# Patient Record
Sex: MALE | URBAN - METROPOLITAN AREA
[De-identification: names, ages, dates, MRNs, and addresses within clinical notes are randomized per-mention and may not be internally consistent; named-entity substitution may affect disease eponyms.]

---

## 2022-08-25 ENCOUNTER — TELEPHONE (OUTPATIENT)
Dept: GASTROENTEROLOGY | Facility: CLINIC | Age: 72
End: 2022-08-25

## 2022-08-25 NOTE — TELEPHONE ENCOUNTER
08/25/22  Screened by: Arianne Pereyra    Referring Provider     Pre- Screening: There is no height or weight on file to calculate BMI  Has patient been referred for a routine screening Colonoscopy? yes  Is the patient between 39-70 years old? yes      Previous Colonoscopy yes   If yes:    Date: 6-10 yrs ago w/ Dr Neymar Mesa:     Reason:       SCHEDULING STAFF: If the patient is between 39yrs-47yrs, please advise patient to confirm benefits/coverage with their insurance company for a routine screening colonoscopy, some insurance carriers will only cover at [facility] or [facility]  If the patient is over 66years old, please schedule an office visit  Does the patient want to see a Gastroenterologist prior to their procedure OR are they having any GI symptoms? no    Has the patient been hospitalized or had abdominal surgery in the past 6 months? no    Does the patient use supplemental oxygen? no    Does the patient take Coumadin, Lovenox, Plavix, Elliquis, Xarelto, or other blood thinning medication? no    Has the patient had a stroke, cardiac event, or stent placed in the past year? no     PT PASSED OA AND CAN BE REACHED [phone]  Previous pt of Dr Marilyn Mcmillan  SCHEDULING STAFF: If patient answers NO to above questions, then schedule procedure  If patient answers YES to above questions, then schedule office appointment  If patient is between 45yrs - 49yrs, please advise patient that we will have to confirm benefits & coverage with their insurance company for a routine screening colonoscopy

## 2022-10-07 ENCOUNTER — TELEPHONE (OUTPATIENT)
Dept: GASTROENTEROLOGY | Facility: CLINIC | Age: 72
End: 2022-10-07

## 2022-10-07 DIAGNOSIS — Z12.11 ENCOUNTER FOR SCREENING COLONOSCOPY: Primary | ICD-10-CM

## 2022-10-07 NOTE — TELEPHONE ENCOUNTER
Scheduled date of colonoscopy (as of today):10/17/22  Physician performing colonoscopy:Dr Albert Bernard  Location of colonoscopy:Endo  Bowel prep reviewed with patient:Vilma  Instructions reviewed with patient by: Buzz Yung  Clearances: No

## 2022-10-17 ENCOUNTER — ANESTHESIA EVENT (OUTPATIENT)
Dept: GASTROENTEROLOGY | Facility: AMBULATORY SURGERY CENTER | Age: 72
End: 2022-10-17

## 2022-10-17 ENCOUNTER — ANESTHESIA (OUTPATIENT)
Dept: GASTROENTEROLOGY | Facility: AMBULATORY SURGERY CENTER | Age: 72
End: 2022-10-17

## 2022-10-17 ENCOUNTER — HOSPITAL ENCOUNTER (OUTPATIENT)
Dept: GASTROENTEROLOGY | Facility: AMBULATORY SURGERY CENTER | Age: 72
Discharge: HOME | End: 2022-10-17
Payer: COMMERCIAL

## 2022-10-17 VITALS
OXYGEN SATURATION: 95 % | DIASTOLIC BLOOD PRESSURE: 66 MMHG | SYSTOLIC BLOOD PRESSURE: 116 MMHG | RESPIRATION RATE: 19 BRPM | BODY MASS INDEX: 32.15 KG/M2 | HEART RATE: 60 BPM | HEIGHT: 65 IN | WEIGHT: 193 LBS | TEMPERATURE: 97.5 F

## 2022-10-17 DIAGNOSIS — Z86.010 HISTORY OF COLON POLYPS: ICD-10-CM

## 2022-10-17 PROCEDURE — 45380 COLONOSCOPY AND BIOPSY: CPT | Performed by: INTERNAL MEDICINE

## 2022-10-17 PROCEDURE — 88305 TISSUE EXAM BY PATHOLOGIST: CPT | Performed by: PATHOLOGY

## 2022-10-17 RX ORDER — BUPROPION HYDROCHLORIDE 200 MG/1
200 TABLET, EXTENDED RELEASE ORAL 2 TIMES DAILY
COMMUNITY

## 2022-10-17 RX ORDER — PROPOFOL 10 MG/ML
INJECTION, EMULSION INTRAVENOUS AS NEEDED
Status: DISCONTINUED | OUTPATIENT
Start: 2022-10-17 | End: 2022-10-17

## 2022-10-17 RX ORDER — LIDOCAINE HYDROCHLORIDE 10 MG/ML
INJECTION, SOLUTION EPIDURAL; INFILTRATION; INTRACAUDAL; PERINEURAL AS NEEDED
Status: DISCONTINUED | OUTPATIENT
Start: 2022-10-17 | End: 2022-10-17

## 2022-10-17 RX ORDER — SODIUM CHLORIDE, SODIUM LACTATE, POTASSIUM CHLORIDE, CALCIUM CHLORIDE 600; 310; 30; 20 MG/100ML; MG/100ML; MG/100ML; MG/100ML
50 INJECTION, SOLUTION INTRAVENOUS CONTINUOUS
Status: DISCONTINUED | OUTPATIENT
Start: 2022-10-17 | End: 2022-10-21 | Stop reason: HOSPADM

## 2022-10-17 RX ADMIN — PROPOFOL 100 MG: 10 INJECTION, EMULSION INTRAVENOUS at 09:15

## 2022-10-17 RX ADMIN — PROPOFOL 20 MG: 10 INJECTION, EMULSION INTRAVENOUS at 09:32

## 2022-10-17 RX ADMIN — PROPOFOL 30 MG: 10 INJECTION, EMULSION INTRAVENOUS at 09:21

## 2022-10-17 RX ADMIN — PROPOFOL 20 MG: 10 INJECTION, EMULSION INTRAVENOUS at 09:29

## 2022-10-17 RX ADMIN — PROPOFOL 20 MG: 10 INJECTION, EMULSION INTRAVENOUS at 09:23

## 2022-10-17 RX ADMIN — PROPOFOL 50 MG: 10 INJECTION, EMULSION INTRAVENOUS at 09:16

## 2022-10-17 RX ADMIN — LIDOCAINE HYDROCHLORIDE 50 MG: 10 INJECTION, SOLUTION EPIDURAL; INFILTRATION; INTRACAUDAL; PERINEURAL at 09:15

## 2022-10-17 RX ADMIN — SODIUM CHLORIDE, SODIUM LACTATE, POTASSIUM CHLORIDE, CALCIUM CHLORIDE 50 ML/HR: 600; 310; 30; 20 INJECTION, SOLUTION INTRAVENOUS at 09:04

## 2022-10-17 RX ADMIN — PROPOFOL 20 MG: 10 INJECTION, EMULSION INTRAVENOUS at 09:26

## 2022-10-17 NOTE — H&P
History and Physical - [facility]  Shila Barboza 81 y o  male MRN: [id number]    HPI: Shila Barboza is a 81y o  year old male who presents for average risk screening colonoscopy    REVIEW OF SYSTEMS: Per the HPI, and otherwise unremarkable  Historical Information   No past medical history on file  No past surgical history on file  Social History   Social History     Substance and Sexual Activity   Alcohol Use Not on file     Social History     Substance and Sexual Activity   Drug Use Not on file     Social History     Tobacco Use   Smoking Status Not on file   Smokeless Tobacco Not on file     No family history on file  Meds/Allergies       Current Outpatient Medications:   •  polyethylene glycol (GOLYTELY) 4000 mL solution    Current Facility-Administered Medications:   •  lactated ringers infusion, 50 mL/hr, Intravenous, Continuous    Not on File    Objective     There were no vitals taken for this visit  PHYSICAL EXAM    Gen: NAD AAOx3  Head: Normocephalic, Atraumatic  CV: S1S2 RRR no m/r/g  CHEST: Clear b/l no c/r/w  ABD: soft, +BS NT/ND  EXT: no edema    ASSESSMENT/PLAN:  This is a 81y o  year old male here for average risk screening colonoscopy, and he is stable and optimized for his procedure

## 2022-10-17 NOTE — ANESTHESIA PREPROCEDURE EVALUATION
Procedure:  COLONOSCOPY    Relevant Problems   ANESTHESIA (within normal limits)      CARDIO   (+) Hyperlipidemia      GI/HEPATIC   (+) GERD (gastroesophageal reflux disease)      NEURO/PSYCH   (+) Anxiety   (+) Depression      PULMONARY  no known MARCOS, pt reports he suspects it   (-) Smoking (remote former heavy smoker)   (-) URI (upper respiratory infection)    BMI 34    Physical Exam    Airway    Mallampati score: I  TM Distance: >3 FB       Dental   Comment: Denies loose but some broken,     Cardiovascular      Pulmonary      Other Findings         Anesthesia Plan  ASA Score- 2     Anesthesia Type- IV sedation with anesthesia with ASA Monitors  Additional Monitors:   Airway Plan:           Plan Factors-Exercise tolerance (METS): >4 METS  Chart reviewed  Existing labs reviewed  Patient summary reviewed  Patient is a current smoker (MJ)  Induction- intravenous  Postoperative Plan-     Informed Consent- Anesthetic plan and risks discussed with patient  I personally reviewed this patient with the CRNA  Discussed and agreed on the Anesthesia Plan with the CRNA  Dara Soulier

## 2022-10-17 NOTE — ANESTHESIA POSTPROCEDURE EVALUATION
Post-Op Assessment Note    CV Status:  Stable  Pain Score: 0    Pain management: adequate     Mental Status:  Alert and awake   Hydration Status:  Euvolemic   PONV Controlled:  Controlled   Airway Patency:  Patent      Post Op Vitals Reviewed: Yes      Staff: Anesthesiologist, CRNA         No complications documented      BP   101/64   Temp   na   Pulse  60   Resp  14   SpO2   96

## 2022-11-28 NOTE — PSYCH
[facility]/Hospital: [facility]   [address] [facility]    Psychiatric Progress Note  MRN#: [id number]  Louise Guerrier 81 y o  male    This note was not shared with the patient due to reasonable likelihood of causing patient harm   This Patient was seen in the office today at [place] [address] location  This is a transfer patient of LILIANA Wiseman    Subjective:  Erlin Felix has history of depression, today reported not feeling depressed , more anxiety  With  Intellectualization defense and humor  Later reported that the anxiety is due to normal things  Mentioned about fellow that live at Erlin Felix address -rooming house  Erlin Felix  does favors for him and he does for Erlin Felix  Don't think he's a bad chelsie, although Erlin Felix wants to leave - complaints of noise leave - and chelsie slamming doors  Erlin Felix anxiety  Is linked to generalized worries , butterflys     On Wellbutrin 200mg BID- Notice difference with staying off task  Medication side effects - change how is mouth feels inside     Erlin Felix was  AAOx 2 ( person, time, slight confusion regarding location   Reported that as [employer], then [employer]   Also stated [employer] linked to [address]        ROS:  Sleep - stable   Denies SI, HI  Without  H/o SA    Medical ROS:  Sprained his leg        Allergies   Allergen Reactions   • Prednisone Other (See Comments)     Pt states " I felt like the veins in my neck were over pressurized"       Medical Hx:   HLD  H/o BPV ( while Erlin Felix was in HS)  Tinnitus - while laying down spinaling  Ashley Chamberlain reported that as tolerable     Social Hx  Children ; none  Marital Status : single  Living Situation- DORINA Scherer in rooming house  Occupational : was a kinza mittalist; on welfare  Functional Supports: Erlin Felix circles of friends   ADL/iADL- independent , drives      MMSE:   Also reported location as [facility]     Mental Status Evaluation:  General Appearance:  Azeb Vitale is a 81 y o   male casually dressed, adequate hygiene and grooming, looks stated age  Wearing glasses    Behavior:  pleasant, cooperative, adequate eye contact   Speech:  normal for rate, rhythm, volume, latency, amount   Mood:  anxious   Affect:  normal   Thought Process:  circumstantial and tangential   Thought Content:  no overt delusions, normal   Perceptual Disturbances: Does not appear preoccupied or responding   Delusions  No   Risk Potential: Suicidal Ideations No  Homicidal Ideations No  Potential for Aggression No     Sensorium:  Oriented to person, place, time/date and situation   Memory:  recent and remote memory grossly intact   Consciousness:  alert and awake   Attention: attention span and concentration are age appropriate   Insight:  fair   Judgment: fair   Gait/Station: abnormal gait  slightly wide going from sitting to standing with backwards bend   Motor Activity: no abnormal movements     There were no vitals filed for this visit  Medications:   Current Outpatient Medications on File Prior to Visit   Medication Sig Dispense Refill   • buPROPion (WELLBUTRIN SR) 200 MG 12 hr tablet Take 200 mg by mouth 2 (two) times a day     • VITAMIN D PO Take 1 tablet by mouth 2 (two) times a day     • [DISCONTINUED] polyethylene glycol (GOLYTELY) 4000 mL solution Take 4,000 mL by mouth once for 1 dose 4000 mL 0     No current facility-administered medications on file prior to visit  Labs: I have personally reviewed all pertinent laboratory/tests results   Most Recent Labs: No results found for: WBC, RBC, HGB, HCT, PLT, RDW, NEUTROABS, SODIUM, K, CL, CO2, BUN, CREATININE, GLUC, GLUF, CALCIUM, AST, ALT, ALKPHOS, TP, ALB, TBILI, CHOLESTEROL, HDL, TRIG, LDLCALC, NONHDLC, VALPROICTOT, CARBAMAZEPIN, LITHIUM, AMMONIA, [id number], FREET4, T3FREE, PREGSERUM, HCG, HCGQUANT, RPR, HGBA1C, EAG    ________________________________________________________________________    A/P  Fausto Masters is a 81 yr old  male , h/o depression and anxiety  Presented with some generalized anxiety like symptoms, unsure duration  On Bupropion 200mg BID x 15 yrs  Case complicated by s/e tinnitus  H/o of BPV, likely also the cause    DSM5  Unspecified Anxiety ;Rule out JÚNIOR   History of depression      PLAN:   Discussed diagnotic impression   Today Did not change Venkatesh's medication  Although Discussed options of antianxiety drug  Current Outpatient Medications:   •  buPROPion (WELLBUTRIN SR) 200 MG 12 hr tablet, Take 200 mg by mouth 2 (two) times a day, Disp: , Rfl:   •  VITAMIN D PO, Take 1 tablet by mouth 2 (two) times a day, Disp: , Rfl:      No orders of the defined types were placed in this encounter  Risks, benefits, and possible side effects of medications explained to patient and patient verbalizes understanding  Next Appointment:  3 month       Today's Appointment@ [facility]  Face To Face:  8:34 AM -9:10AM;Documentation Time:  9:15 AM- 9:33 AM    Encounter Duration: Time Spent 36 minutes with Patient  Greater than 50% of total time was spent with the patient

## 2022-11-28 NOTE — PATIENT INSTRUCTIONS
Norma Dye [id number]   Thanks for presenting to today's Appointment at [facility]   Your medications were not changed

## 2022-11-29 ENCOUNTER — OFFICE VISIT (OUTPATIENT)
Dept: PSYCHIATRY | Facility: CLINIC | Age: 72
End: 2022-11-29

## 2022-11-29 DIAGNOSIS — Z86.59 HISTORY OF DEPRESSION: Primary | ICD-10-CM

## 2022-11-29 RX ORDER — BUPROPION HYDROCHLORIDE 200 MG/1
TABLET, EXTENDED RELEASE ORAL
Qty: 60 TABLET | Refills: 2 | Status: SHIPPED | OUTPATIENT
Start: 2022-11-29

## 2023-04-28 DIAGNOSIS — Z86.59 HISTORY OF DEPRESSION: ICD-10-CM

## 2023-05-01 RX ORDER — BUPROPION HYDROCHLORIDE 200 MG/1
TABLET, EXTENDED RELEASE ORAL
Qty: 180 TABLET | Refills: 1 | Status: SHIPPED | OUTPATIENT
Start: 2023-05-01

## 2023-07-18 NOTE — PSYCH
[facility]/Hospital: [address] [facility], [address]    Psychiatric Progress Note  MRN#: [id number]  Leslie Lopez 72 y.o. male    This note was not shared with the patient due to reasonable likelihood of causing patient harm   _________________________________________________________________________________________________________________________________  OFFICE APPOINTMENT   Patient was seen today at [address] [facility] location                                                Patient Leslie Lopez ,1950   Prescriber/Physician: Soern Medellin DO Physician Location:   [address] [facility] [address]     • This service was provided in the office. Patient is currently located in the [place], where I am  licensed. • Patient gave consent to proceed with encounter; acknowledge understanding of security and privacy of encounter   • Patient verbalized understanding evaluation only involves Psychiatric diagnosing, prescribing, result monitoring   • Patient was informed this is a billable service  ___________________________________________________________________________________________________________________________________       Subjective:  Pepe Smith is upbeat, Denies Si/hi, although hypothetical thoughts. He's compliant to Bupropion , stated that's working - more organized , more effective. Denies recent falls . Pepe Smith spoke about prior events of not getting drafted. Denied trauma. He enjoys hobbies of wood work. ROS:  Sleep - stable , wake up intermittently  to use the bathroom. Depression- denies  Anxiety- mild       Medical ROS:  Sweating   Knee pain - get shots   No recent falls. Social Hx  [place] - lives with two roommates . No abuse    HMental Status Evaluation:  General Appearance:  Leslie Lopez is a 72 y.o.   male casually dressed, adequate hygiene and grooming, looks stated age. Wearing glasses    Behavior:  pleasant, cooperative, adequate eye contact   Speech:  normal for rate, rhythm, volume, latency, amount   Mood:  normal   Affect:  normal   Thought Process:  circumstantial and tangential   Thought Content:  no overt delusions, normal   Perceptual Disturbances: Does not appear preoccupied or responding   Delusions  No   Risk Potential: Suicidal Ideations No  Homicidal Ideations No  Potential for Aggression No     Sensorium:  Oriented to person, place, time/date and situation   Memory:  recent and remote memory grossly intact   Consciousness:  alert and awake   Attention: attention span and concentration are age appropriate   Insight:  fair   Judgment: fair   Gait/Station: normal   Motor Activity: no abnormal movements     There were no vitals filed for this visit. Medications:   Current Outpatient Medications on File Prior to Visit   Medication Sig Dispense Refill   • buPROPion (WELLBUTRIN SR) 200 MG 12 hr tablet TAKE ONE TABLET BY MOUTH TWICE A  tablet 1   • VITAMIN D PO Take 1 tablet by mouth 2 (two) times a day     • [DISCONTINUED] polyethylene glycol (GOLYTELY) 4000 mL solution Take 4,000 mL by mouth once for 1 dose 4000 mL 0     No current facility-administered medications on file prior to visit. Labs: I have personally reviewed all pertinent laboratory/tests results. Most Recent Labs: No results found for: "WBC", "RBC", "HGB", "HCT", "PLT", "RDW", "NEUTROABS", "SODIUM", "K", "CL", "CO2", "BUN", "CREATININE", "GLUC", "GLUF", "CALCIUM", "AST", "ALT", "ALKPHOS", "TP", "ALB", "TBILI", "CHOLESTEROL", "HDL", "TRIG", "LDLCALC", "[facility]", "VALPROICTOT", "CARBAMAZEPIN", "LITHIUM", "AMMONIA", "[id number]", "[place]", "Amparo Pian", "PREGSERUM", "HCG", "HCGQUANT", "RPR", "HGBA1C", "EAG"    ________________________________________________________________________    A/KADEEM Artis is a 72 y. o.old  male , h/o depression and anxiety. Presented with anixety. Today , Kitty Lazcano reported stable mood  In the setting of compliance to Bupropion. DSM5  Unspecified Anxiety  History of depression      PLAN:   Discussed diagnotic impression . External recorded reviewed . Did not change patients medications    Medications Prescribed during this Encounter at [facility]:    •  buPROPion (WELLBUTRIN SR) 200 MG 12 hr tablet, Bupropion HCL SR  200m tablet in the morning ( 7-8am) , 1 tablet in the afternoon ( 1pm), Disp: 180 tablet, Rfl: 0      No orders of the defined types were placed in this encounter. Risks, benefits, and possible side effects of medications explained to patient and patient verbalizes understanding. Next Appointment:  3 month       Today's Appointment@ [facility]  Face To Face:  8:32AM- 9:02AM ;Documentation Time:  9:15 AM- 9:33 AM    Encounter Duration: Time Spent 30 minutes with Patient. Greater than 50% of total time was spent with the patient       MDM  Number of Diagnoses or Management Options  Anxiety: established, improving  History of depression: established, improving     Amount and/or Complexity of Data Reviewed  Review and summarize past medical records: yes    Risk of Complications, Morbidity, and/or Mortality  Presenting problems: low  Diagnostic procedures: low  Management options: low

## 2023-07-18 NOTE — PATIENT INSTRUCTIONS
Reva Sibley [id number]   Thanks for presenting to today's Appointment at [facility]   Your medications were not changed

## 2023-07-21 ENCOUNTER — OFFICE VISIT (OUTPATIENT)
Dept: PSYCHIATRY | Facility: CLINIC | Age: 73
End: 2023-07-21
Payer: COMMERCIAL

## 2023-07-21 DIAGNOSIS — F41.9 ANXIETY: Primary | ICD-10-CM

## 2023-07-21 DIAGNOSIS — Z86.59 HISTORY OF DEPRESSION: ICD-10-CM

## 2023-07-21 PROCEDURE — 99212 OFFICE O/P EST SF 10 MIN: CPT | Performed by: PSYCHIATRY & NEUROLOGY

## 2023-07-21 RX ORDER — BUPROPION HYDROCHLORIDE 200 MG/1
TABLET, EXTENDED RELEASE ORAL
Qty: 180 TABLET | Refills: 0 | Status: SHIPPED | OUTPATIENT
Start: 2023-07-21

## 2023-10-11 NOTE — PSYCH
[facility]/Hospital: [address] [place] [address], [address]    Psychiatric Progress Note  MRN#: [id number]  Veronika Cespedes 72 y.o. male    This note was not shared with the patient due to reasonable likelihood of causing patient harm   _________________________________________________________________________________________________________________________________  OFFICE APPOINTMENT   Seen today at [address] [facility] location                                                Patient Veronika Cespedes ,1950   Prescriber/Physician: Cayla Hui DO Physician Location:   [address] [address]     This service was provided in the office. Patient is currently located in the [place], where I am  licensed. Patient gave consent to proceed with encounter; acknowledge understanding of security and privacy of encounter   [facility]  Patient verbalized understanding evaluation only involves Psychiatric diagnosing, prescribing, result monitoring   Patient was informed this is a billable service and legal  ___________________________________________________________________________________________________________________________________     Encounter: 10/13/23     Chief Complaint   Patient presents with   • Anxiety          Subjective: There's compliance to Bupropion and he thinks it working since adjustment to when he takes Bupropion ( 8am and 12pm). Otherwise anxiety> depression- about world situation and he's on welfare -and he wants to work again- although arthrisits get in [place] [address] [address].    Claudette Alpers stated worthlessness comments, otherwise  there's anxiety about "thinking  there's no way out, worries about his social security ."       ROS:  SI/HI  : denies  Depression: defer to above  Anxiety: defer to above Irritability: " always here"- frustrated easy, also impatience; if he noticed it, then he trys to reign himself in. Sleep: no problems  Energy: normal  Concentration- stable- he's able to stay on task while on Bupropoin        Medication: Juan Paris is  compliant Bupropion SR 100mg x 2  Medication s/e: S/e of taste in Venkatesh mouth- described as strange sensation - mild intensity and tolerable. Tobacco Use: Medium Risk (10/17/2022)    Patient History    • Smoking Tobacco Use: Former    • Smokeless Tobacco Use: Never    • Passive Exposure: Not on file        Former   Smokeless Tobacco: Never used smokeless tobacco.   Tobacco Cessation: Counseling given: Not Answered   Comments: Sergio Medellin last smoked 6-8 yrs ago     Vaping Use    Never used     Alcohol Use    Yes; 2.0 standard drinks of alcohol per week; 2 Standard drinks or equivalent. Drug Use    Yes; Marijuana. Comments: he smoke half times per wk about 1/3 joint         Medical ROS:   Constitutional: negative for chills and fevers  Respiratory: negative for wheezing and SOB  Cardiovascular: negative for chest pain and palpitations  Gastrointestinal: negative for nausea, vomiting, and indigestion  Neurological: positive for dizziness due to beign vertigo, negative for headaches   Pertinent items are noted in HPI, all other symptoms are negative           Mental Status Evaluation:  General Appearance:  Juan Paris is a 72 y.o.  male casually dressed, looks stated age.  Wearing glasses    Behavior:  Slight anxious , adequate eye contact   Speech:  normal for rate, rhythm, volume, latency, amount   Mood:  Mild anxiety   Affect:  mood-congruent   Thought Process:  circumstantial and tangential   Thought Content:  no overt delusions, normal   Perceptual Disturbances: Does not appear preoccupied or responding   Delusions  w/o   Risk Potential: Suicidal Ideations w/o  Homicidal Ideations w/o  Potential for Aggression No     Sensorium:  Oriented to person, place [facility]), time/date ( 2023) and situation   Memory:  recent and remote memory grossly intact   Consciousness:  alert and awake   Attention: attention span and concentration are age appropriate   Insight:  Appropriate    Judgment: Appropriate    Gait/Station: normal   Motor Activity: no abnormal movements     There were no vitals filed for this visit. Medications:   Current Outpatient Medications on File Prior to Visit   Medication Sig Dispense Refill   • buPROPion (WELLBUTRIN SR) 200 MG 12 hr tablet Bupropion HCL SR  200m tablet in the morning ( 7-8am) , 1 tablet in the afternoon ( 1pm) 180 tablet 0   • VITAMIN D PO Take 1 tablet by mouth 2 (two) times a day     • [DISCONTINUED] polyethylene glycol (GOLYTELY) 4000 mL solution Take 4,000 mL by mouth once for 1 dose 4000 mL 0     No current facility-administered medications on file prior to visit. Labs: I have personally reviewed all pertinent laboratory/tests results. Most Recent Labs: No results found for: "WBC", "RBC", "HGB", "HCT", "PLT", "RDW", "NEUTROABS", "SODIUM", "K", "CL", "CO2", "BUN", "CREATININE", "GLUC", "GLUF", "CALCIUM", "AST", "ALT", "ALKPHOS", "TP", "ALB", "TBILI", "CHOLESTEROL", "HDL", "TRIG", "LDLCALC", "[facility]", "VALPROICTOT", "CARBAMAZEPIN", "LITHIUM", "AMMONIA", "[id number]", "[employer]", "[place]", "PREGSERUM", "HCG", "HCGQUANT", "RPR", "HGBA1C", "EAG"    ________________________________________________________________________    A/P  Chance Hernandez is a 72 y. o.old  male , h/o depression and anxiety. Presented with anxiety, currently mild intensity. Currently with mild depressive finding , although devoid of SI since he compliant to Bupropion. DSM5  Unspecified Depression  Unspecified Anxiety        PLAN:   Discussed diagnotic impression . External recorded reviewed .    Did not change patients medications    Medications Prescribed during this Encounter at [facility]:    -     buPROPion (WELLBUTRIN SR) 200 MG 12 hr tablet; Bupropion HCL SR  200m tablet in the morning ( 7-8am) , 1 tablet in the afternoon ( 12pm)       No orders of the defined types were placed in this encounter. Risks, benefits, and possible side effects of medications explained to patient and patient verbalizes understanding. Next Appointment:  3 month       [facility]  Face To Face:  8:40AM-  9:12AM      Encounter Duration: Time Spent  32 minutes with Patient. Greater than 50% of total time was spent with the patient       MDM  Number of Diagnoses or Management Options  Diagnosis management comments: 2       Amount and/or Complexity of Data Reviewed  Review and summarize past medical records: yes    Risk of Complications, Morbidity, and/or Mortality  Presenting problems: low  Diagnostic procedures: low  Management options: low           This note may have been written with the assistance of dictation software.  Please excuse any grammatical  errors, misspellings,  and abnormal spacing of letters , sentences or paragraphs

## 2023-10-11 NOTE — PATIENT INSTRUCTIONS
Avtar Johnston [id number]   Thanks for presenting to today's Appointment at [facility]  Your medications were not changed

## 2023-10-13 ENCOUNTER — OFFICE VISIT (OUTPATIENT)
Dept: PSYCHIATRY | Facility: CLINIC | Age: 73
End: 2023-10-13
Payer: COMMERCIAL

## 2023-10-13 DIAGNOSIS — F41.9 ANXIETY: Primary | ICD-10-CM

## 2023-10-13 DIAGNOSIS — F32.A DEPRESSION, UNSPECIFIED DEPRESSION TYPE: ICD-10-CM

## 2023-10-13 PROBLEM — Z86.59 HISTORY OF DEPRESSION: Status: ACTIVE | Noted: 2023-10-13

## 2023-10-13 PROCEDURE — 99213 OFFICE O/P EST LOW 20 MIN: CPT | Performed by: PSYCHIATRY & NEUROLOGY

## 2023-10-13 RX ORDER — BUPROPION HYDROCHLORIDE 200 MG/1
TABLET, EXTENDED RELEASE ORAL
Qty: 180 TABLET | Refills: 0 | Status: SHIPPED | OUTPATIENT
Start: 2023-10-13

## 2023-10-25 DIAGNOSIS — F32.A DEPRESSION, UNSPECIFIED DEPRESSION TYPE: ICD-10-CM

## 2023-10-25 RX ORDER — BUPROPION HYDROCHLORIDE 200 MG/1
TABLET, EXTENDED RELEASE ORAL
Qty: 180 TABLET | Refills: 0 | Status: SHIPPED | OUTPATIENT
Start: 2023-10-25

## 2023-10-25 NOTE — TELEPHONE ENCOUNTER
Pt Emily Montgomery, 1950, [facility] chart reviewed, Bupropion 200mg was sent to [address] [place] [address],[address]     This note was not shared with the patient due to reasonable likelihood of causing patient harm
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
## 2024-01-30 NOTE — PATIENT INSTRUCTIONS
Venkatesh Pringle [id number]   Thanks for presenting to today's Appointment at [facility]  Your medications were not changed

## 2024-02-02 ENCOUNTER — OFFICE VISIT (OUTPATIENT)
Dept: PSYCHIATRY | Facility: CLINIC | Age: 74
End: 2024-02-02
Payer: COMMERCIAL

## 2024-02-02 DIAGNOSIS — F32.A DEPRESSION, UNSPECIFIED DEPRESSION TYPE: Primary | ICD-10-CM

## 2024-02-02 DIAGNOSIS — F41.9 ANXIETY: ICD-10-CM

## 2024-02-02 PROCEDURE — 99214 OFFICE O/P EST MOD 30 MIN: CPT | Performed by: PSYCHIATRY & NEUROLOGY

## 2024-02-02 RX ORDER — BUPROPION HYDROCHLORIDE 200 MG/1
TABLET, EXTENDED RELEASE ORAL
Qty: 180 TABLET | Refills: 0 | Status: SHIPPED | OUTPATIENT
Start: 2024-02-02

## 2024-02-02 NOTE — BH TREATMENT PLAN
TREATMENT PLAN                                                                [facility]     Name and Date of Birth:  Venkatesh Pringle 73 y.o. 1950  Date of Treatment Plan: February 2, 2024  Diagnosis/Diagnoses:    1. Depression, unspecified depression type    2. Anxiety      Strengths/Personal Resources for Self-Care:Venkatesh Pringle  supportive family, supportive friends, ability to communicate well, curiosity , has hobbies   Area/Areas of need (in own words): anxiety symptoms, depressive symptoms  1)  Long Term Goal:          maintain improvement in anxiety and depression  Reach Goal Date: 1 yr-2 yrs  Person/Persons responsible for completion of goal: Venkatesh Pringle    2. Short Term Objective (s) - How to reach this goal?:           Recommended treatment : Adherence to antidepressants due to diagnoses. Medication List : Bupropion          [facility] for symptoms improving           Routine monitoring of labs if necessary           CRISIS intervention/Acute Hospitalization if necessary   Reach Goal Date: 6 months- 1yr  Person/Persons Responsible for Completion of Goal: Venkatesh Pringle    Progress Towards Goals: stable  Treatment Modality: [facility]  Review due 180 days from date of this plan: 6 months - 8/2/2024  Expected length of service: 1-3yrs unless revised    This treatment plan was formulated via my Physician/ Psychiatrist and I . I, Venkatesh Pringle, patient , understand the goals and objectives listed . I agree to work on goals developed for my treatment.

## 2024-02-02 NOTE — BH CRISIS PLAN
"[facility] Mental Health Outpatient    Client Name: Venkatesh Pringle       Client YOB: 1950     CRISIS PLAN Creation Date: 02/02/24 and CRISIS PLAN Review Date : 1 yr -2/2/2025  ____________________________________________________________________________________________________________  [facility] SAFETY PLAN      Step 1: Warning Signs:   Venkatesh Pringle you stated:  Anger (Short Fuse)         Step 2: Internal Coping Strategies:   Venkatesh Pringle you stated:  Breathing techniques          Step 3: People and social settings that provide distraction                    Names                                                                    Contacts     Delmer Bradley    (Venkatesh friend)                     [phone]  Ernie Pringle ( Venkatesh Brother)                            Has number in his phone                                                                                                                                                        Places[place]                                                                                          Step 4: People whom I can ask for help during a crisis:                             Name                                                                       Contact  Khoa Huerta    ( Venkatesh neighbor)                        Has number in his phone  Fermin Champagne           \"             \"                                       \"           \"       \"    \"     \"         Step 5: Professionals or agencies I can contact during a crisis:Ask: “What are the names of health care professionals, agencies, hospitals or other organizations that you can contact during a crisis and how will you contact them (include phone number or other way to contact them)?”    Clinician/Agency Name:                                         Phone                                               Emergency Contact   [facility]       " "                                                     Local Emergency Department:                              Emergency Dept Phone                  Emergency Dept Address     [facility]                                             Has access to number           GP ( Dr. Noel Hardwick PCP                                   \"       \"        \"     \"                                                   CRISIS PHONE NUMBERS   Suicide Prevention Lifeline: Call 4221-161-LEDD or Text  229 Crisis Text Line: Text HOME to 645-255   Please note: Some [facility] do not have a separate number for Child/Adolescent specific crisis. If your county is not listed under Child/Adolescent, please call the adult number for your county      Adult Crisis Numbers: Child/Adolescent Crisis Numbers   [facility]: [phone] [facility]: [phone]   [facility]: [phone] [facility]: [phone]   [facility]: [phone] [place]: [phone]   [facility]: [phone] [place]: [phone]   [facility]: [phone]   [facility]: [phone]   [facility]: [phone]   [facility]: [phone] (daytime) [phone] (after hours, weekends, holidays)      Step 6: Making the environment safer (plan for lethal means safety):Ask: “For each lethal method that is identified, what is the specific plan to reduce access to this lethal method so that time will pass, your suicide feelings will diminish and it will be less likely that you will actually kill yourself? Who may assist you with this plan to make your environment safer?”    Patient did not identify any lethal methods              Optional:What is most important to me and worth living for?   Venkatesh Pringle you stated: Your memories, curiosity and the people that you love ( family and friends)         [place] Safety Plan. Eileen Wakefield and Manoj Garcia ;  Use permission via  " authors  ____________________________________________________________________________________________________________    This CRISIS plan was formulated via my Physician/ Psychiatrist and Venkatesh COMBS patient ,   , understand and accept the CRISIS plan  developed for my treatment.

## 2024-03-14 NOTE — PSYCH
[facility]: [facility] Outpatient Clinic/Non Addiction   [address] [id number] [phone]    Psychiatric Progress Note  MRN#: [id number]  Venkatesh Pringle 74 y.o. male    This note was not shared with the patient due to reasonable likelihood of causing patient harm   _________________________________________________________________________________________________________________________________  OFFICE APPOINTMENT   Seen today at [facility] location                                                Patient Venkatesh Pringle ,1950   Prescriber/Physician: Tra Burch DO Physician Location:   [facility] OUTPATIENT  [address] [address]     This service was provided in the office.  Patient is currently located in the [place], where I am  licensed.   Patient gave consent to proceed with encounter; acknowledge understanding of security and privacy of encounter   [facility]  Patient verbalized understanding evaluation only involves Psychiatric diagnosing, prescribing, result monitoring   Patient was informed this is a billable service and legal  ___________________________________________________________________________________________________________________________________      Reason for Visit:    Chief Complaint   Patient presents with    Depression    Anxiety        Subjective:  Venkatesh Pringle  reported depression and anxiety has not  intensified since prior [facility] appointment , rated severity  4 out of 10.  Finding of agitation, pressured disorganized dialogue, restlessness, distracted ,  talkative  he was circumstantial to tangential  with questions asked.   He also complained of medical problems : arthritic pain >lower extremities, back , and base of the neck  Denied recent head trauma or substance abuse.   Discussed finding of bipolar disorder within his  "prior two [facility] appointments and encouraged he sign TED  Venkatesh also reported h/o bipolar disorder questioned , when he was previous patient of Dr. Cash  He also reported slight agitation while on the medication intermittently     ROS:  Energy: He reported as high    Sleep : stable   Anxiety: described worries , thinking worst case scenario \" him walking off a abdullahi or into a wall\"    SI/HI: denies  Appetite: normal   Illiict: He last smoked < 1 wk ago , 1/2 joint cause of arthritis      Medication: Venkatesh Pringle is  compliant Bupropion SR 200mg x 2        Medical ROS:   Pertinent items are noted in HPI, all other symptoms are negative       Mental Status Evaluation:  General Appearance:  Venkatesh Pringle is a 74 y.o.  male casually dressed, looks stated age. Wearing glasses    Behavior:  +Restlessness, cooperative to uncooperative, agitation , appropriate eye gaze , problems hearing    Speech:  hyper-talkative, pressured speech, and intermittently incoherent   Mood:  Agitated    Affect:  Irritable , limited range    Thought Process:  circumstantial, disorganized, illogical, logical, and tangential   Thought Content:  no overt delusions, slight somatic preoccupation, redirectable    Perceptual Disturbances: denies auditory hallucinations when asked, denies visual hallucinations when asked, Does not appear preoccupied or responding   Delusions  w/o   Risk Potential: Suicidal Ideations w/o  Homicidal Ideations w/o  Potential for Aggression w/o   Sensorium:  Oriented to person, place ( [facility]), time/date (March 2024) and situation   Memory:  recent and remote memory grossly intact   Consciousness:  alert and awake   Attention: attention span and concentration areappropriate   Insight:  Appropriate    Judgment: Appropriate    Gait/Station: normal   Motor Activity: no abnormal movements     There were no vitals filed for this visit.     Medications:   Current Outpatient Medications on File Prior to " "Visit   Medication Sig Dispense Refill    buPROPion (WELLBUTRIN SR) 200 MG 12 hr tablet Bupropion SR 200mg : 1 tablet po daily  x 2 (morning 8am; 12pm) 180 tablet 0    VITAMIN D PO Take 1 tablet by mouth 2 (two) times a day      [DISCONTINUED] polyethylene glycol (GOLYTELY) 4000 mL solution Take 4,000 mL by mouth once for 1 dose 4000 mL 0     No current facility-administered medications on file prior to visit.        Labs: I have personally reviewed all pertinent laboratory/tests results. Most Recent Labs: No results found for: \"WBC\", \"RBC\", \"HGB\", \"HCT\", \"PLT\", \"RDW\", \"NEUTROABS\", \"SODIUM\", \"K\", \"CL\", \"CO2\", \"BUN\", \"CREATININE\", \"GLUC\", \"GLUF\", \"CALCIUM\", \"AST\", \"ALT\", \"ALKPHOS\", \"TP\", \"ALB\", \"TBILI\", \"CHOLESTEROL\", \"HDL\", \"TRIG\", \"LDLCALC\", \"NONHDLC\", \"VALPROICTOT\", \"CARBAMAZEPIN\", \"LITHIUM\", \"AMMONIA\", \"[id number]\", \"FREET4\", \"T3FREE\", \"PREGSERUM\", \"HCG\", \"HCGQUANT\", \"RPR\", \"HGBA1C\", \"EAG\"    ________________________________________________________________________    A/P  Venkatesh Pringle is a 74 y.o.old  male , h/o depression and anxiety who presented with mild anxiety, and later mild depressive findings . Currently with suspision of bipolar process, noted pressured, disorganized dialogue, with intense elated mood to mild frustration, and high energy. There's history of talkativeness and then stability somewhat. Currently there persistent worries of bipolar I disorder, since prior [facility] appointment similar findings although notable agitation.     DSM5  1. Bipolar  disorder,  unspecified (HCC)    2. Anxiety    3. Depression, unspecified depression type       R/O Substance Induced Cindy      PLAN:   History and external records were reviewed. Discussed clinical findings and diagnotic impression :  rule out  bipolar 1 disorder  Discussed starting Aripiprazole, ,pt Venkatesh Brunsonser was resistant   He was encouraged to complete TED for collateral gathering  Did not change patient medications  CRISIS plan and " Treatment plan was completed 02/02/24 , pt Venkatesh Pringle was informed to sign forms      Medications List Also      -     buPROPion (WELLBUTRIN SR) 200 MG 12 hr tablet; Bupropion SR 200mg : 1 tablet po daily  x 2 (morning 8am; 12pm), qty 180         No orders of the defined types were placed in this encounter.       Risks, benefits, and possible side effects of medications explained to patient and patient verbalizes understanding.        [facility]  ______________________________________________________________________________________________  Patient Encounter:    8:43  - 9:07              Documenation : 9:47- 10:05   Encounter Duration  33 minutes with Patient.Greater than 50% of total time was spent with the patient       MDM  Number of Diagnoses or Management Options  Diagnosis management comments: 3       Amount and/or Complexity of Data Reviewed  Review and summarize past medical records: yes    Risk of Complications, Morbidity, and/or Mortality  Presenting problems: moderate  Diagnostic procedures: moderate  Management options: moderate        This note may have been written with the assistance of dictation software. Please excuse any grammatical  errors, misspellings,  and abnormal spacing of letters , sentences or paragraphs . For accurate interpretation should read note horizontally

## 2024-03-15 ENCOUNTER — OFFICE VISIT (OUTPATIENT)
Dept: PSYCHIATRY | Facility: CLINIC | Age: 74
End: 2024-03-15
Payer: COMMERCIAL

## 2024-03-15 DIAGNOSIS — F31.9 BIPOLAR AFFECTIVE DISORDER, REMISSION STATUS UNSPECIFIED (HCC): Primary | ICD-10-CM

## 2024-03-15 DIAGNOSIS — F32.A DEPRESSION, UNSPECIFIED DEPRESSION TYPE: ICD-10-CM

## 2024-03-15 DIAGNOSIS — F41.9 ANXIETY: ICD-10-CM

## 2024-03-15 PROCEDURE — 99213 OFFICE O/P EST LOW 20 MIN: CPT | Performed by: PSYCHIATRY & NEUROLOGY

## 2024-03-15 NOTE — PATIENT INSTRUCTIONS
Venkatesh Pringle [id number]   Thanks for presenting to today's Appointment at [facility]  Your medications were not changed

## 2024-04-08 NOTE — PSYCH
[facility]: [facility]   [address], [place], [facility]  [phone]    Psychiatric Progress Note  MRN#: [id number]  Venkatesh Pringle 74 y.o. male    This note was not shared with the patient due to reasonable likelihood of causing patient harm   _________________________________________________________________________________________________________________________________  OFFICE APPOINTMENT   Seen today at [facility] location                                                Patient Venkatesh Pringle ,1950   Prescriber/Physician: Tra Burch DO Physician Location:   [facility] OUTPATIENT  [address] [facility] [address] [id number]     This service was provided in the office.  Patient is currently located in the [place], where I am  licensed.   Patient gave consent to proceed with encounter; acknowledge understanding of security and privacy of encounter   Patient identity was verified as well as the [facility] chart  Patient verbalized understanding evaluation only involves Psychiatric diagnosing, prescribing, result monitoring   Patient was informed this is a billable service and legal  ___________________________________________________________________________________________________________________________________      Reason for Visit:    Chief Complaint   Patient presents with    Manic Behavior        Subjective:  Venkatesh Pringle  stated mood as  elated , positive, sleeping 8 hrs, low energy ,cause of arthritic pain   Findings of pressured dialogued, hyper , talkative , slight disorganized , difficult to redirect .He acknowledged smoke cannabis on average less than once wkly, history   smoking 2-3 x wk in the, within 3-4 yrs, onset when he was 29 yo  Venkatesh Pringle was oriented to person, date,location- reported as [facility] , note during previously 3-4 encounter he reported  locations as [facility]? Although when probe was able to state [facility]                        ROS:   Trauma: History trauma, bullying ,although he denies nightmares flashbacks  Psychosis: denies hallucination, delusion,   SI/HI:  denies  Appetite:  stable   Alcohol: less than 1 drink per wk on average  Tobacco: former smoker , he was 3 ppd         Medication: Venkatesh Pringle is  compliant Bupropion SR 200mg x 2  Med S/e: denies         Medical ROS:   Venkatesh Pringle Postive dizziness and headaches - intermittent, prior to starting Bupropion; denies recent falls or problems with gait.  Denies chest pain, palpations  Pertinent items are noted in HPI, all other symptoms are negative       Mental Status Evaluation:  General Appearance:  Venkatesh Pringle is a 74 y.o.  male casually dressed, looks stated age. Wearing glasses    Behavior:  +Restlessness,  agitation , appropriate eye gaze , problems hearing    Speech:  hyper-talkative, pressured speech, and intermittently incoherent   Mood:  Positive, elated   Affect:  Elated ,limited range    Thought Process:  circumstantial, disorganized, illogical, logical, and tangential   Thought Content:  no overt delusions, normal   Perceptual Disturbances: denies auditory hallucinations when asked, denies visual hallucinations when asked, Does not appear preoccupied or responding   Delusions  w/o   Risk Potential: Suicidal Ideations w/o  Homicidal Ideations w/o  Potential for Aggression w/o   Sensorium:  Oriented to person, place ( [facility]), time/date (April 2024) and situation   Memory:  recent and remote memory grossly intact   Consciousness:  alert and awake   Attention: attention span and concentration areappropriate   Insight:  Appropriate    Judgment: Appropriate    Gait/Station: normal   Motor Activity: no abnormal movements     There were no vitals filed for this visit.     Medications:   Current Outpatient Medications on File Prior to Visit  "  Medication Sig Dispense Refill    buPROPion (WELLBUTRIN SR) 200 MG 12 hr tablet Bupropion SR 200mg : 1 tablet po daily  x 2 (morning 8am; 12pm) 180 tablet 0    VITAMIN D PO Take 1 tablet by mouth 2 (two) times a day      [DISCONTINUED] polyethylene glycol (GOLYTELY) 4000 mL solution Take 4,000 mL by mouth once for 1 dose 4000 mL 0     No current facility-administered medications on file prior to visit.        Labs: I have personally reviewed all pertinent laboratory/tests results. Most Recent Labs: No results found for: \"WBC\", \"RBC\", \"HGB\", \"HCT\", \"PLT\", \"RDW\", \"NEUTROABS\", \"SODIUM\", \"K\", \"CL\", \"CO2\", \"BUN\", \"CREATININE\", \"GLUC\", \"GLUF\", \"CALCIUM\", \"AST\", \"ALT\", \"ALKPHOS\", \"TP\", \"ALB\", \"TBILI\", \"CHOLESTEROL\", \"HDL\", \"TRIG\", \"LDLCALC\", \"NONHDLC\", \"VALPROICTOT\", \"CARBAMAZEPIN\", \"LITHIUM\", \"AMMONIA\", \"DWA0MCSRKCOB\", \"FREET4\", \"T3FREE\", \"PREGSERUM\", \"HCG\", \"HCGQUANT\", \"RPR\", \"HGBA1C\", \"EAG\"    ________________________________________________________________________    A/P  Venkatesh Pringle is a 74 y.o.old  male , h/o depression and anxiety, who presented with mild anxiety, and later mild depressive findings. Currently with persistent findings of Bipolar dee although without anger. Findings of pressured dialogued, disorganized, restlessness); his presentation is more prominent within recent two [facility] visit than previously. History of questioning if he had bipolar disorder via prior [facility] , Dr Gary. Rule out substance induced dee , history of smoking marijuana  vs bipolar I disorder       DSM5  1. Bipolar affective disorder, remission status unspecified (HCC)    2. Depression, unspecified depression type    3. Anxiety             PLAN:   History and external records were reviewed. Discussed clinical findings and diagnotic impression   Discussed starting Aripiprazole, ,pt Venkatesh Pringle was resistant   He was encouraged to complete TED for collateral gathering  Did not change patient medications  CRISIS plan " and Treatment plan was completed 02/02/24 , pt Venkatesh Pringle was informed to sign forms      Medications Prescribed During [facility] Encounter:    -     buPROPion (WELLBUTRIN SR) 200 MG 12 hr tablet; Bupropion SR 200mg : 1 tablet po daily  x 2 (morning 8am; 12pm)           No orders of the defined types were placed in this encounter.       Risks, benefits, and possible side effects of medications explained to patient and patient verbalizes understanding.        Next [facility] Appointment:  4wk  ______________________________________________________________________________________________  Patient Encounter:    9:42 - 10:03                      Documentation : 10:05- 10:14  Encounter Duration  29 minutes with Patient.Greater than 50% of total time was spent with the patient       MDM  Number of Diagnoses or Management Options  Diagnosis management comments: 3       Amount and/or Complexity of Data Reviewed  Review and summarize past medical records: yes    Risk of Complications, Morbidity, and/or Mortality  Presenting problems: high  Diagnostic procedures: moderate  Management options: moderate        This note may have been written with the assistance of dictation software. Please excuse any grammatical  errors, misspellings,  and abnormal spacing of letters , sentences or paragraphs . For accurate interpretation should read note horizontally

## 2024-04-08 NOTE — PATIENT INSTRUCTIONS
Venkatesh Pringle [id number]   Thanks for presenting to today's Appointment at [facility]  Your medications were not changed

## 2024-04-10 ENCOUNTER — OFFICE VISIT (OUTPATIENT)
Dept: PSYCHIATRY | Facility: CLINIC | Age: 74
End: 2024-04-10
Payer: COMMERCIAL

## 2024-04-10 DIAGNOSIS — F31.9 BIPOLAR AFFECTIVE DISORDER, REMISSION STATUS UNSPECIFIED (HCC): Primary | ICD-10-CM

## 2024-04-10 DIAGNOSIS — F32.A DEPRESSION, UNSPECIFIED DEPRESSION TYPE: ICD-10-CM

## 2024-04-10 DIAGNOSIS — F41.9 ANXIETY: ICD-10-CM

## 2024-04-10 PROCEDURE — 99214 OFFICE O/P EST MOD 30 MIN: CPT | Performed by: PSYCHIATRY & NEUROLOGY

## 2024-04-10 RX ORDER — BUPROPION HYDROCHLORIDE 200 MG/1
TABLET, EXTENDED RELEASE ORAL
Qty: 180 TABLET | Refills: 0 | Status: SHIPPED | OUTPATIENT
Start: 2024-04-10

## 2024-07-10 ENCOUNTER — TELEPHONE (OUTPATIENT)
Dept: PSYCHIATRY | Facility: CLINIC | Age: 74
End: 2024-07-10

## 2024-07-10 DIAGNOSIS — F32.A DEPRESSION, UNSPECIFIED DEPRESSION TYPE: ICD-10-CM

## 2024-07-10 RX ORDER — BUPROPION HYDROCHLORIDE 200 MG/1
TABLET, EXTENDED RELEASE ORAL
Qty: 180 TABLET | Refills: 0 | Status: SHIPPED | OUTPATIENT
Start: 2024-07-10

## 2024-07-10 NOTE — TELEPHONE ENCOUNTER
Pt requested refill of the bupropioni 200mg     Gets a 90 day supply sent to the [facility] on file

## 2024-07-10 NOTE — TELEPHONE ENCOUNTER
Patient called back, he wants a new psychiatrist and would like a refill on his medications so I transferred him to Central Scheduling.

## 2024-07-10 NOTE — TELEPHONE ENCOUNTER
Writer called client to inform them that Dr. Burch has left the practice and that client will have to be rescheduled with a new provider. Client has medication concerns and was forwarded to med line at end of the call.     Client also inquired regarding transportation. Client is in [facility] and [place]    Writer reached out to case management to see if there is a solution there. Client's insurance does not cover virtual.

## 2024-07-10 NOTE — TELEPHONE ENCOUNTER
Client called back and left voicemail due to computer saying he still had an appointment. Writer returned call, made sure appointment was properly cancelled, and informed client that the refill was sent to [facility] based on the med line following up.

## 2024-07-25 ENCOUNTER — TELEPHONE (OUTPATIENT)
Dept: PSYCHIATRY | Facility: CLINIC | Age: 74
End: 2024-07-25

## 2024-08-09 ENCOUNTER — TELEPHONE (OUTPATIENT)
Age: 74
End: 2024-08-09

## 2024-08-09 NOTE — TELEPHONE ENCOUNTER
Patient called to find out his appointment date and time. Writer confirmed his appointment with Krystyna Sargent 8/19/24 at 10:30am.

## 2024-08-19 ENCOUNTER — OFFICE VISIT (OUTPATIENT)
Dept: PSYCHIATRY | Facility: CLINIC | Age: 74
End: 2024-08-19
Payer: MEDICARE

## 2024-08-19 ENCOUNTER — TELEPHONE (OUTPATIENT)
Age: 74
End: 2024-08-19

## 2024-08-19 DIAGNOSIS — F32.A DEPRESSION, UNSPECIFIED DEPRESSION TYPE: ICD-10-CM

## 2024-08-19 PROCEDURE — 90792 PSYCH DIAG EVAL W/MED SRVCS: CPT | Performed by: STUDENT IN AN ORGANIZED HEALTH CARE EDUCATION/TRAINING PROGRAM

## 2024-08-19 NOTE — TELEPHONE ENCOUNTER
Please note that the cutoff for the appointment is 15 minutes. Anytime later than 15 minutes, the appointment will be cancelled and need to be rescheduled. If the patient cannot conveniently make it in time today, they should reschedule.

## 2024-08-19 NOTE — TELEPHONE ENCOUNTER
Patient called to let office know he will be about 10-15 minutes late for todays appt 8/19/2024 at 10:30am

## 2024-08-19 NOTE — PROGRESS NOTES
MEDICATION MANAGEMENT NOTE        [facility]      Name and Date of Birth:  Venkatesh Pringle 74 y.o. 1950 MRN: [id number]    Date of Visit: August 20, 2024    Reason for Visit: Follow-up visit for medication management       SUBJECTIVE:    Venkatesh Pringle is a 74 y.o. male with past psychiatric history significant for MDD, JÚNIOR who was personally seen and evaluated today at the [facility] outpatient clinic for follow-up and medication management. Venkatesh is a transfer of care from another provider.    The patient has been under the care of [facility] for approximately two decades, initially referred for a depression evaluation. He has been on Bupropion (Wellbutrin) for the same duration, which he reports has had a positive effect on his mood, although he admits he is unsure of what depression is supposed to feel like. He describes himself as jovial and outgoing. The dosage of Bupropion started at a lower level and has since been increased to 200 mg. This is the only psychotropic the patient is currently taking for the past 20 years.    The patient is retired due to arthritis affecting his ability to stand. He was previously a  by [employer] and continues to engage in making things as a hobby. He lives with two other individuals, one of whom drove him to the consultation.     Patient denies any history of SI, suicide attempts, HI, AVH, delusions, dee.  Given patient's reported lack of previous manic symptoms, he would not qualify for bipolar disorder.  He denies any history of psychiatric hospitalization as well.    The patient has a history of smoking, having started at the age of 28 and smoked up to two packs a day. 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 He also admits to occasional drinking alcohol of about 3-4 beers per week but denies any history of withdrawal symptoms blackouts or seizures. He has  used marijuana, which he sources from known friends, and uses it to help with his arthritis pain. He has tried cocaine in the past but had an allergic reaction to it.    The patient describes his childhood as akin to walking through a minefield or on eggshells due to his father's short temper. He believes this may have left some scars that he is currently dealing with.     After discussion of risks, benefits, potential side effects, alternatives, we will continue on current regimen of Wellbutrin 200 mg immediate release twice daily as is without any changes due to patient's preference and his reported efficacy with this dose.  He also expressed interest in [employer] social events for patient's.  He denies any acute mental complaints or concerns at this time and denies any significant feelings of depression or anxiety.          Current Rating Scores:     Current PHQ-9   PHQ-2/9 Depression Screening    Little interest or pleasure in doing things: 0 - not at all  Feeling down, depressed, or hopeless: 0 - not at all  Trouble falling or staying asleep, or sleeping too much: 0 - not at all  Feeling tired or having little energy: 1 - several days  Poor appetite or overeating: 3 - nearly every day  Feeling bad about yourself - or that you are a failure or have let yourself or your family down: 1 - several days  Trouble concentrating on things, such as reading the newspaper or watching television: 3 - nearly every day  Moving or speaking so slowly that other people could have noticed. Or the opposite - being so fidgety or restless that you have been moving around a lot more than usual: 0 - not at all  Thoughts that you would be better off dead, or of hurting yourself in some way: 0 - not at all  PHQ-9 Score: 8  PHQ-9 Interpretation: Mild depression       Current JÚNIOR-7 is   JÚNIOR-7 Flowsheet Screening      Flowsheet Row Most Recent Value   Over the last two weeks, how often have you been bothered by the following  "problems?     Feeling nervous, anxious, or on edge 3   Not being able to stop or control worrying 1   Worrying too much about different things 3   Trouble relaxing  0   Being so restless that it's hard to sit still 0   Becoming easily annoyed or irritable  1   Feeling afraid as if something awful might happen 0   JÚNIOR Score  8        .    Review Of Systems:      Constitutional negative   ENT negative   Cardiovascular negative   Respiratory negative   Gastrointestinal negative   Genitourinary negative   Musculoskeletal joint pain   Integumentary negative   Neurological negative   Endocrine negative   Other Symptoms none, all other systems are negative       Past Psychiatric History: (unchanged information from previous note copied and italicized) - Information that is bolded has been updated.     See intake    Substance Abuse History: (unchanged information from previous note copied and italicized) - Information that is bolded has been updated.     See intake    Social History: (unchanged information from previous note copied and italicized) - Information that is bolded has been updated.     See intake    Traumatic History: (unchanged information from previous note copied and italicized) - Information that is bolded has been updated.     See intake      Past Medical History:    Past Medical History:   Diagnosis Date    Anxiety     Arthritis     Depression     GERD (gastroesophageal reflux disease)     Hyperlipidemia         Past Surgical History:   Procedure Laterality Date    COLONOSCOPY      PAROTIDECTOMY Left      Allergies   Allergen Reactions    Prednisone Other (See Comments)     Pt states \" I felt like the veins in my neck were over pressurized\"       Substance Abuse History:    Social History     Substance and Sexual Activity   Alcohol Use Yes    Alcohol/week: 2.0 standard drinks of alcohol    Types: 2 Standard drinks or equivalent per week     Social History     Substance and Sexual Activity   Drug Use Yes    " Types: Marijuana    Comment: he smoke half times per wk about 1/3 joint       Social History:    Social History     Socioeconomic History    Marital status: Single     Spouse name: Not on file    Number of children: Not on file    Years of education: Not on file    Highest education level: Not on file   Occupational History    Not on file   Tobacco Use    Smoking status: Former    Smokeless tobacco: Never    Tobacco comments:     Venkatesh last smoked 6-8 yrs ago   Vaping Use    Vaping status: Never Used   Substance and Sexual Activity    Alcohol use: Yes     Alcohol/week: 2.0 standard drinks of alcohol     Types: 2 Standard drinks or equivalent per week    Drug use: Yes     Types: Marijuana     Comment: he smoke half times per wk about 1/3 joint    Sexual activity: Not on file   Other Topics Concern    Not on file   Social History Narrative    Not on file     Social Determinants of Health     Financial Resource Strain: Not on file   Food Insecurity: Not on file   Transportation Needs: Not on file   Physical Activity: Not on file   Stress: Not on file   Social Connections: Not on file   Intimate Partner Violence: Not on file   Housing Stability: Not on file       Family Psychiatric History:     No family history on file.  Brother may have schizophrenia but patient is not sure    History Review: The following portions of the patient's history were reviewed and updated as appropriate: allergies, current medications, past family history, past medical history, past social history, past surgical history, and problem list.         OBJECTIVE:     Vital signs in last 24 hours:    There were no vitals filed for this visit.    Mental Status Evaluation:    Appearance age appropriate, casually dressed   Behavior cooperative, calm   Speech normal rate, normal volume, normal pitch   Mood euthymic   Affect normal range and intensity, appropriate   Thought Processes organized, goal directed   Associations intact associations    Thought Content no overt delusions   Perceptual Disturbances: no auditory hallucinations, no visual hallucinations   Abnormal Thoughts  Risk Potential Suicidal ideation - None at present  Homicidal ideation - None at present  Potential for aggression - Not at present   Orientation oriented to person, place, time/date, and situation   Memory recent and remote memory grossly intact   Consciousness alert and awake   Attention Span Concentration Span attention span and concentration are age appropriate   Intellect appears to be of average intelligence   Insight intact   Judgement intact   Muscle Strength and  Gait normal muscle strength and normal muscle tone, normal gait and normal balance   Motor activity Mild bilateral tremor which patient states is chronic   Language no difficulty naming common objects, no difficulty repeating a phrase, no difficulty writing a sentence   Fund of Knowledge adequate knowledge of current events  adequate fund of knowledge regarding past history  adequate fund of knowledge regarding vocabulary    Pain none   Pain Scale Did not ask patient to formally rate       Laboratory Results: I have personally reviewed all pertinent laboratory/tests results    Recent Labs (last 2 months):   No visits with results within 2 Month(s) from this visit.   Latest known visit with results is:   Hospital Outpatient Visit on 10/17/2022   Component Date Value    Case Report 10/17/2022                      Value:Surgical Pathology Report                         Case: [id number]                                   Authorizing Provider:  Venkatesh Pichardo MD      Collected:           10/17/2022 0934              Ordering Location:     [facility] Received:            10/18/2022 0847              Pathologist:           Hubert Moise MD                                                                Specimens:   A) - Polyp, Colorectal, Sigmoid colon polyps  x2                                                     B) - Polyp, Colorectal, Rectal polyps  x2                                                  Final Diagnosis 10/17/2022                      Value:A. Sigmoid colon polyp x2 (biopsy):                              - Portions of polypoid colonic mucosa with focal serrated features,                           compatible with hyperplastic polyps.                                                     B. Rectal polyp x2 (biopsy):                             - Portions of serrated colon polyps compatible with hyperplastic                           polyps.    Additional Information 10/17/2022                      Value:All reported additional testing was performed with appropriately reactive                           controls.  These tests were developed and their performance                           characteristics determined by [facility] or                           appropriate performing facility, though some tests may be performed on                           tissues which have not been validated for performance characteristics                           (such as staining performed on alcohol exposed cell blocks and decalcified                           tissues).  Results should be interpreted with caution and in the context                           of the patients’ clinical condition. These tests may not be cleared or                           approved by the U.S. Food and Drug Administration, though the FDA has                           determined that such clearance or approval is not necessary. These tests                           are used for clinical purposes and they should not be regarded as                           investigational or for research. This laboratory has been approved by CLIA                           88, designated as a high-complexity laboratory and is qualified to perform                           these tests. Interpretation performed at [facility], [address]   "[facility] [address]                          .    Synoptic Checklist 10/17/2022                      Value:                            COLON/RECTUM POLYP FORM - GI - All Specimens                                                                                     :    Other      Gross Description 10/17/2022                      Value:                           A. The specimen is received in formalin, labeled with the patient's name                           and hospital number, and is designated \"sigmoid colon polyp x2\".  The                           specimen consists of 2 tan flat and elongated tissue fragments measuring                           0.4-0.5 cm in greatest dimension.  The specimen is entirely submitted in a                           screened cassette.                                                                                  B. The specimen is received in formalin, labeled with the patient's name                           and hospital number, and is designated \"rectal polyp x2\".  The specimen                           consists of 4 tan-pink polypoid tissue fragments measuring 0.3-0.4 cm in                           greatest dimension.  The specimen is entirely submitted in a screened                           cassette.                                                    Note: The estimated total formalin fixation time based upon information                           provided by the submitting clinician and the standard processing schedule                           is under 72 hours.  Collin Solis                                                                Suicide/Homicide Risk Assessment:    Risk of Harm to Self:  The following ratings are based on assessment at the time of the interview  Historical Risk Factors include: chronic psychiatric problems  Protective Factors: no current suicidal ideation, access to mental health treatment, compliant with medications, " compliant with mental health treatment, having a desire to be alive, stable living environment    Risk of Harm to Others:  The following ratings are based on assessment at the time of the interview  Historical Risk Factors include: none.  Protective Factors: no current homicidal ideation, access to mental health treatment, compliant with medications, compliant with mental health treatment    The following interventions are recommended: contracts for safety at present - agrees to go to ED if feeling unsafe, contracts for safety at present - agrees to call Crisis Intervention Service if feeling unsafe      Lethality Statement:    Based on today's assessment and clinical criteria, Venkatesh Pringle contracts for safety and is not an imminent risk of harm to self or others. Outpatient level of care is deemed appropriate at this current time. Venkatesh understands that if they can no longer contract for safety, they need to call the office or report to their nearest Emergency Room for immediate evaluation. They voiced understanding and agreement to call 911 or head to the nearest ED should they have any physical or mental decompensation whatsoever.       Assessment/Plan:     1.)  MDD, recurrent, mild  2.)  History of JÚNIOR  3.)    After discussion of risks, benefits, potential side effects, alternatives, we will continue Wellbutrin 200 mg twice daily due to its efficacy for patient.  Clinical navigation will help connect patient with activities such as club house.        Aware of 24 hour and weekend coverage for urgent situations accessed by calling [facility] main practice number    Medications Risks/Benefits      Risks, Benefits And Possible Side Effects Of Medications:    Risks, benefits, and possible side effects of medications explained to Venkatesh and he verbalizes understanding and agreement for treatment.    Controlled Medication Discussion:     Not applicable    Psychotherapy Provided:      Individual psychotherapy provided: Crisis/safety plan discussed with Venkatesh.     Treatment Plan:    Completed and signed during the session: Not applicable - Treatment Plan not due at this session      Visit Time    Visit Start Time: 10:30 AM  Visit Stop Time: 11:15 AM  Total Visit Duration:  45 minutes     The total visit duration detailed above includes: patient engagement, medication management, psychotherapy/counseling, discussion regarding treatment goals, documentation, review of past medical records, and coordination of care.      Note Share Disclaimer:     This note was not shared with the patient due to reasonable likelihood of causing patient harm      Arie Greenwood DO  Psychiatry  08/20/24

## 2024-08-20 RX ORDER — BUPROPION HYDROCHLORIDE 200 MG/1
TABLET, EXTENDED RELEASE ORAL
Qty: 180 TABLET | Refills: 0 | Status: SHIPPED | OUTPATIENT
Start: 2024-08-20

## 2024-08-23 ENCOUNTER — TELEPHONE (OUTPATIENT)
Dept: PSYCHIATRY | Facility: CLINIC | Age: 74
End: 2024-08-23

## 2024-08-23 NOTE — TELEPHONE ENCOUNTER
Left voicemail informing patient and/or parent/guardian of the Psych Encounter form needing to be signed as a requirement from the insurance company for billing purposes. Patient can access form via [employer] and sign electronically.     Please make patient aware this form must be signed for each visit as a requirement to continue future visits with provider.

## 2024-11-19 ENCOUNTER — OFFICE VISIT (OUTPATIENT)
Dept: PSYCHIATRY | Facility: CLINIC | Age: 74
End: 2024-11-19
Payer: COMMERCIAL

## 2024-11-19 DIAGNOSIS — F41.9 ANXIETY: Primary | ICD-10-CM

## 2024-11-19 DIAGNOSIS — F32.A DEPRESSION, UNSPECIFIED DEPRESSION TYPE: ICD-10-CM

## 2024-11-19 PROCEDURE — 99214 OFFICE O/P EST MOD 30 MIN: CPT | Performed by: STUDENT IN AN ORGANIZED HEALTH CARE EDUCATION/TRAINING PROGRAM

## 2024-11-25 RX ORDER — BUPROPION HYDROCHLORIDE 200 MG/1
TABLET, EXTENDED RELEASE ORAL
Qty: 180 TABLET | Refills: 0 | Status: SHIPPED | OUTPATIENT
Start: 2024-11-25

## 2024-11-25 NOTE — PSYCH
MEDICATION MANAGEMENT NOTE        [facility]      Name and Date of Birth:  Venkatesh Pringle 74 y.o. 1950 MRN: [id number]    Date of Visit: November 25, 2024    Reason for Visit: Follow-up visit for medication management       SUBJECTIVE:    Venkatesh Pringle is a 74 y.o. male with past psychiatric history significant for MDD, JÚNIOR who was personally seen and evaluated today at the [facility] outpatient clinic for follow-up and medication management. Venkatesh denies SI, HI, AVH, delusions, dee since our last appointment.  He does continue to have intermittent feelings of dysphoria and anxiety secondary to election results however states that he is coping well and is interested in getting involved with opportunities such as club house and is also continuing to explore personal hobbies like creating work support with metal.  After discussion of risks, benefits, potential side effects, alternatives, he would like to stay on current regimen as is without any changes due to its effectiveness.  He denies acute mental complaints or concerns at this time.          Current Rating Scores:     None completed today.    Review Of Systems:      Constitutional negative   ENT negative   Cardiovascular negative   Respiratory negative   Gastrointestinal negative   Genitourinary negative   Musculoskeletal negative   Integumentary negative   Neurological negative   Endocrine negative   Other Symptoms none, all other systems are negative       Past Psychiatric History: (unchanged information from previous note copied and italicized) - Information that is bolded has been updated.     See intake    Substance Abuse History: (unchanged information from previous note copied and italicized) - Information that is bolded has been updated.     See intake    Social History: (unchanged information from previous note copied and italicized) - Information that is bolded has been  "updated.     See intake    Traumatic History: (unchanged information from previous note copied and italicized) - Information that is bolded has been updated.     See intake      Past Medical History:    Past Medical History:   Diagnosis Date    Anxiety     Arthritis     Depression     GERD (gastroesophageal reflux disease)     Hyperlipidemia         Past Surgical History:   Procedure Laterality Date    COLONOSCOPY      PAROTIDECTOMY Left      Allergies   Allergen Reactions    Prednisone Other (See Comments)     Pt states \" I felt like the veins in my neck were over pressurized\"       Substance Abuse History:    Social History     Substance and Sexual Activity   Alcohol Use Yes    Alcohol/week: 2.0 standard drinks of alcohol    Types: 2 Standard drinks or equivalent per week     Social History     Substance and Sexual Activity   Drug Use Yes    Types: Marijuana    Comment: he smoke half times per wk about 1/3 joint       Social History:    Social History     Socioeconomic History    Marital status: Single     Spouse name: Not on file    Number of children: Not on file    Years of education: Not on file    Highest education level: Not on file   Occupational History    Not on file   Tobacco Use    Smoking status: Former    Smokeless tobacco: Never    Tobacco comments:     Venkatesh last smoked 6-8 yrs ago   Vaping Use    Vaping status: Never Used   Substance and Sexual Activity    Alcohol use: Yes     Alcohol/week: 2.0 standard drinks of alcohol     Types: 2 Standard drinks or equivalent per week    Drug use: Yes     Types: Marijuana     Comment: he smoke half times per wk about 1/3 joint    Sexual activity: Not on file   Other Topics Concern    Not on file   Social History Narrative    Not on file     Social Drivers of Health     Financial Resource Strain: Not on file   Food Insecurity: Not on file   Transportation Needs: Not on file   Physical Activity: Not on file   Stress: Not on file   Social Connections: Not on " file   Intimate Partner Violence: Not on file   Housing Stability: Not on file       Family Psychiatric History:     No family history on file.    History Review: The following portions of the patient's history were reviewed and updated as appropriate: allergies, current medications, past family history, past medical history, past social history, past surgical history, and problem list.         OBJECTIVE:     Vital signs in last 24 hours:    There were no vitals filed for this visit.    Mental Status Evaluation:    Appearance age appropriate, casually dressed   Behavior cooperative, mildly anxious   Speech normal rate, normal volume, normal pitch   Mood normal   Affect constricted   Thought Processes organized, goal directed   Associations intact associations   Thought Content no overt delusions   Perceptual Disturbances: no auditory hallucinations, no visual hallucinations   Abnormal Thoughts  Risk Potential Suicidal ideation - None at present  Homicidal ideation - None at present  Potential for aggression - Not at present   Orientation oriented to person, place, time/date, and situation   Memory recent and remote memory grossly intact   Consciousness alert and awake   Attention Span Concentration Span attention span and concentration are age appropriate   Intellect appears to be of average intelligence   Insight intact   Judgement intact   Muscle Strength and  Gait normal muscle strength and normal muscle tone, normal gait and normal balance   Motor activity no abnormal movements   Language no difficulty naming common objects, no difficulty repeating a phrase, no difficulty writing a sentence   Fund of Knowledge adequate knowledge of current events  adequate fund of knowledge regarding past history  adequate fund of knowledge regarding vocabulary    Pain none   Pain Scale Did not ask patient to formally rate       Laboratory Results: I have personally reviewed all pertinent laboratory/tests results    Recent Labs  (last 2 months):   No visits with results within 2 Month(s) from this visit.   Latest known visit with results is:   Hospital Outpatient Visit on 10/17/2022   Component Date Value    Case Report 10/17/2022                      Value:Surgical Pathology Report                         Case: [id number]                                   Authorizing Provider:  Venkatesh Pichardo MD      Collected:           10/17/2022 0934              Ordering Location:     [facility] [place] Received:            10/18/2022 0873              Pathologist:           Hubert Moise MD                                                                Specimens:   A) - Polyp, Colorectal, Sigmoid colon polyps  x2                                                    B) - Polyp, Colorectal, Rectal polyps  x2                                                  Final Diagnosis 10/17/2022                      Value:A. Sigmoid colon polyp x2 (biopsy):                              - Portions of polypoid colonic mucosa with focal serrated features,                           compatible with hyperplastic polyps.                                                     B. Rectal polyp x2 (biopsy):                             - Portions of serrated colon polyps compatible with hyperplastic                           polyps.    Additional Information 10/17/2022                      Value:All reported additional testing was performed with appropriately reactive                           controls.  These tests were developed and their performance                           characteristics determined by [facility] or                           appropriate performing facility, though some tests may be performed on                           tissues which have not been validated for performance characteristics                           (such as staining performed on alcohol exposed cell blocks and decalcified                           tissues).  Results  "should be interpreted with caution and in the context                           of the patients’ clinical condition. These tests may not be cleared or                           approved by the U.S. Food and Drug Administration, though the FDA has                           determined that such clearance or approval is not necessary. These tests                           are used for clinical purposes and they should not be regarded as                           investigational or for research. This laboratory has been approved by [place]                           88, designated as a high-complexity laboratory and is qualified to perform                           these tests. Interpretation performed at [facility], [facility] [facility] [address]                          .    Synoptic Checklist 10/17/2022                      Value:                            COLON/RECTUM POLYP FORM - GI - All Specimens                                                                                     :    Other      Gross Description 10/17/2022                      Value:                           A. The specimen is received in formalin, labeled with the patient's name                           and hospital number, and is designated \"sigmoid colon polyp x2\".  The                           specimen consists of 2 tan flat and elongated tissue fragments measuring                           0.4-0.5 cm in greatest dimension.  The specimen is entirely submitted in a                           screened cassette.                                                                                  B. The specimen is received in formalin, labeled with the patient's name                           and hospital number, and is designated \"rectal polyp x2\".  The specimen                           consists of 4 tan-pink polypoid tissue fragments measuring 0.3-0.4 cm in                           greatest dimension.  The " specimen is entirely submitted in a screened                           cassette.                                                    Note: The estimated total formalin fixation time based upon information                           provided by the submitting clinician and the standard processing schedule                           is under 72 hours.  Collin Solis                                                                Suicide/Homicide Risk Assessment:    Risk of Harm to Self:  The following ratings are based on assessment at the time of the interview  Historical Risk Factors include: chronic psychiatric problems  Protective Factors: no current suicidal ideation, access to mental health treatment, compliant with medications, compliant with mental health treatment, having a desire to be alive, sense of importance of health and wellness    Risk of Harm to Others:  The following ratings are based on assessment at the time of the interview  Historical Risk Factors include: none.  Protective Factors: no current homicidal ideation, access to mental health treatment    The following interventions are recommended: contracts for safety at present - agrees to go to ED if feeling unsafe, contracts for safety at present - agrees to call Crisis Intervention Service if feeling unsafe      Lethality Statement:    Based on today's assessment and clinical criteria, Venkatesh Pringle contracts for safety and is not an imminent risk of harm to self or others. Outpatient level of care is deemed appropriate at this current time. Venkatesh understands that if they can no longer contract for safety, they need to call the office or report to their nearest Emergency Room for immediate evaluation. They voiced understanding and agreement to call 911 or head to the nearest ED should they have any physical or mental decompensation whatsoever.       Assessment/Plan:     1.)  MDD, recurrent, mild  2.)  JÚNIOR  3.)      After discussion of risks,  benefits, potential side effects, alternatives, patient would like to continue on current regimen as is without any changes due to its effectiveness.  We will continue Wellbutrin 200 mg twice daily and patient will be referred to clinical navigation for connection with resources including financial planning, opportunities for social interaction such as club house.  He denies acute mental complaints or concerns at this time      Aware of 24 hour and weekend coverage for urgent situations accessed by calling [facility] main practice number    Medications Risks/Benefits      Risks, Benefits And Possible Side Effects Of Medications:    Risks, benefits, and possible side effects of medications explained to Venkatesh and he verbalizes understanding and agreement for treatment.    Controlled Medication Discussion:     Not applicable - controlled prescriptions are not prescribed by this practice    Psychotherapy Provided:     Individual psychotherapy provided: Crisis/safety plan discussed with Venkatesh.     Treatment Plan:    Completed and signed during the session:  We will complete at next appointment due to lack of time today      Visit Time    Visit Start Time: 9:00 AM  Visit Stop Time: 9:20 AM  Total Visit Duration:  20 minutes     The total visit duration detailed above includes: patient engagement, medication management, psychotherapy/counseling, discussion regarding treatment goals, documentation, review of past medical records, and coordination of care.      Note Share Disclaimer:     This note was not shared with the patient due to reasonable likelihood of causing patient harm      Arie Greenwood DO  Psychiatry  11/25/24

## 2025-02-11 ENCOUNTER — TELEPHONE (OUTPATIENT)
Age: 75
End: 2025-02-11

## 2025-02-11 NOTE — TELEPHONE ENCOUNTER
Patient called in after receiving a text message from the office to confirm their upcoming medication management appointment on the 18th.    Patient had an issue with responding to the text to confirm and called in to do so. Patient states it may be that they have an Android phone that is acting up.

## 2025-02-18 ENCOUNTER — OFFICE VISIT (OUTPATIENT)
Dept: PSYCHIATRY | Facility: CLINIC | Age: 75
End: 2025-02-18
Payer: COMMERCIAL

## 2025-02-18 DIAGNOSIS — F32.A DEPRESSION, UNSPECIFIED DEPRESSION TYPE: ICD-10-CM

## 2025-02-18 DIAGNOSIS — F41.9 ANXIETY: Primary | ICD-10-CM

## 2025-02-18 PROCEDURE — 99214 OFFICE O/P EST MOD 30 MIN: CPT | Performed by: STUDENT IN AN ORGANIZED HEALTH CARE EDUCATION/TRAINING PROGRAM

## 2025-02-18 PROCEDURE — 90833 PSYTX W PT W E/M 30 MIN: CPT | Performed by: STUDENT IN AN ORGANIZED HEALTH CARE EDUCATION/TRAINING PROGRAM

## 2025-02-21 RX ORDER — BUPROPION HYDROCHLORIDE 200 MG/1
TABLET, EXTENDED RELEASE ORAL
Qty: 180 TABLET | Refills: 0 | Status: SHIPPED | OUTPATIENT
Start: 2025-02-21

## 2025-02-26 NOTE — PSYCH
MEDICATION MANAGEMENT NOTE        [facility]      Name and Date of Birth:  Venkatesh Pringle 74 y.o. 1950 MRN: [id number]    Date of Visit: February 26, 2025    Reason for Visit: Follow-up visit for medication management       SUBJECTIVE:    Venkatesh Pringle is a 74 y.o. male with past psychiatric history significant for MDD, JÚNIOR who was personally seen and evaluated today at the [facility] outpatient clinic for follow-up and medication management. Venkatesh denies SI, HI, AVH, delusions, dee since our last appointment. After discussion of risks, benefits, potential side effects, alternatives, he would like to stay on current regimen as is without any changes due to its effectiveness.  He denies acute mental complaints or concerns at this time.          Current Rating Scores:     None completed today.    Review Of Systems:      Constitutional negative   ENT negative   Cardiovascular negative   Respiratory negative   Gastrointestinal negative   Genitourinary negative   Musculoskeletal negative   Integumentary negative   Neurological negative   Endocrine negative   Other Symptoms none, all other systems are negative       Past Psychiatric History: (unchanged information from previous note copied and italicized) - Information that is bolded has been updated.     See intake    Substance Abuse History: (unchanged information from previous note copied and italicized) - Information that is bolded has been updated.     See intake    Social History: (unchanged information from previous note copied and italicized) - Information that is bolded has been updated.     See intake    Traumatic History: (unchanged information from previous note copied and italicized) - Information that is bolded has been updated.     See intake      Past Medical History:    Past Medical History:   Diagnosis Date    Anxiety     Arthritis     Depression     GERD  "(gastroesophageal reflux disease)     Hyperlipidemia         Past Surgical History:   Procedure Laterality Date    COLONOSCOPY      PAROTIDECTOMY Left      Allergies   Allergen Reactions    Prednisone Other (See Comments)     Pt states \" I felt like the veins in my neck were over pressurized\"       Substance Abuse History:    Social History     Substance and Sexual Activity   Alcohol Use Yes    Alcohol/week: 2.0 standard drinks of alcohol    Types: 2 Standard drinks or equivalent per week     Social History     Substance and Sexual Activity   Drug Use Yes    Types: Marijuana    Comment: he smoke half times per wk about 1/3 joint       Social History:    Social History     Socioeconomic History    Marital status: Single     Spouse name: Not on file    Number of children: Not on file    Years of education: Not on file    Highest education level: Not on file   Occupational History    Not on file   Tobacco Use    Smoking status: Former    Smokeless tobacco: Never    Tobacco comments:     Venkatesh last smoked 6-8 yrs ago   Vaping Use    Vaping status: Never Used   Substance and Sexual Activity    Alcohol use: Yes     Alcohol/week: 2.0 standard drinks of alcohol     Types: 2 Standard drinks or equivalent per week    Drug use: Yes     Types: Marijuana     Comment: he smoke half times per wk about 1/3 joint    Sexual activity: Not on file   Other Topics Concern    Not on file   Social History Narrative    Not on file     Social Drivers of Health     Financial Resource Strain: Not on file   Food Insecurity: Not on file   Transportation Needs: Not on file   Physical Activity: Not on file   Stress: Not on file   Social Connections: Not on file   Intimate Partner Violence: Not on file   Housing Stability: Not on file       Family Psychiatric History:     No family history on file.    History Review: The following portions of the patient's history were reviewed and updated as appropriate: allergies, current medications, past " family history, past medical history, past social history, past surgical history, and problem list.         OBJECTIVE:     Vital signs in last 24 hours:    There were no vitals filed for this visit.    Mental Status Evaluation:    Appearance age appropriate, casually dressed   Behavior cooperative, mildly anxious   Speech normal rate, normal volume, normal pitch   Mood normal   Affect constricted   Thought Processes organized, goal directed   Associations intact associations   Thought Content no overt delusions   Perceptual Disturbances: no auditory hallucinations, no visual hallucinations   Abnormal Thoughts  Risk Potential Suicidal ideation - None at present  Homicidal ideation - None at present  Potential for aggression - Not at present   Orientation oriented to person, place, time/date, and situation   Memory recent and remote memory grossly intact   Consciousness alert and awake   Attention Span Concentration Span attention span and concentration are age appropriate   Intellect appears to be of average intelligence   Insight intact   Judgement intact   Muscle Strength and  Gait normal muscle strength and normal muscle tone, normal gait and normal balance   Motor activity no abnormal movements   Language no difficulty naming common objects, no difficulty repeating a phrase, no difficulty writing a sentence   Fund of Knowledge adequate knowledge of current events  adequate fund of knowledge regarding past history  adequate fund of knowledge regarding vocabulary    Pain none   Pain Scale Did not ask patient to formally rate       Laboratory Results: I have personally reviewed all pertinent laboratory/tests results    Recent Labs (last 2 months):   No visits with results within 2 Month(s) from this visit.   Latest known visit with results is:   Hospital Outpatient Visit on 10/17/2022   Component Date Value    Case Report 10/17/2022                      Value:Surgical Pathology Report                         Case:  [id number]                                   Authorizing Provider:  Venkatesh Pichardo MD      Collected:           10/17/2022 0934              Ordering Location:     [facility] Received:            10/18/2022 0847              Pathologist:           Hubert Moise MD                                                                Specimens:   A) - Polyp, Colorectal, Sigmoid colon polyps  x2                                                    B) - Polyp, Colorectal, Rectal polyps  x2                                                  Final Diagnosis 10/17/2022                      Value:A. Sigmoid colon polyp x2 (biopsy):                              - Portions of polypoid colonic mucosa with focal serrated features,                           compatible with hyperplastic polyps.                                                     B. Rectal polyp x2 (biopsy):                             - Portions of serrated colon polyps compatible with hyperplastic                           polyps.    Additional Information 10/17/2022                      Value:All reported additional testing was performed with appropriately reactive                           controls.  These tests were developed and their performance                           characteristics determined by [facility] or                           appropriate performing facility, though some tests may be performed on                           tissues which have not been validated for performance characteristics                           (such as staining performed on alcohol exposed cell blocks and decalcified                           tissues).  Results should be interpreted with caution and in the context                           of the patients’ clinical condition. These tests may not be cleared or                           approved by the U.S. Food and Drug Administration, though the FDA has                           determined that  "such clearance or approval is not necessary. These tests                           are used for clinical purposes and they should not be regarded as                           investigational or for research. This laboratory has been approved by CLIA                           88, designated as a high-complexity laboratory and is qualified to perform                           these tests. Interpretation performed at [facility], [address] [address]                          .    Synoptic Checklist 10/17/2022                      Value:                            COLON/RECTUM POLYP FORM - GI - All Specimens                                                                                     :    Other      Gross Description 10/17/2022                      Value:                           A. The specimen is received in formalin, labeled with the patient's name                           and hospital number, and is designated \"sigmoid colon polyp x2\".  The                           specimen consists of 2 tan flat and elongated tissue fragments measuring                           0.4-0.5 cm in greatest dimension.  The specimen is entirely submitted in a                           screened cassette.                                                                                  B. The specimen is received in formalin, labeled with the patient's name                           and hospital number, and is designated \"rectal polyp x2\".  The specimen                           consists of 4 tan-pink polypoid tissue fragments measuring 0.3-0.4 cm in                           greatest dimension.  The specimen is entirely submitted in a screened                           cassette.                                                    Note: The estimated total formalin fixation time based upon information                           provided by the submitting clinician and the standard " processing schedule                           is under 72 hours.  Collin Solis                                                                Suicide/Homicide Risk Assessment:    Risk of Harm to Self:  The following ratings are based on assessment at the time of the interview  Historical Risk Factors include: chronic psychiatric problems  Protective Factors: no current suicidal ideation, access to mental health treatment, compliant with medications, compliant with mental health treatment, having a desire to be alive, sense of importance of health and wellness    Risk of Harm to Others:  The following ratings are based on assessment at the time of the interview  Historical Risk Factors include: none.  Protective Factors: no current homicidal ideation, access to mental health treatment    The following interventions are recommended: contracts for safety at present - agrees to go to ED if feeling unsafe, contracts for safety at present - agrees to call Crisis Intervention Service if feeling unsafe      Lethality Statement:    Based on today's assessment and clinical criteria, Venkatesh Pringle contracts for safety and is not an imminent risk of harm to self or others. Outpatient level of care is deemed appropriate at this current time. Venkatesh understands that if they can no longer contract for safety, they need to call the office or report to their nearest Emergency Room for immediate evaluation. They voiced understanding and agreement to call 911 or head to the nearest ED should they have any physical or mental decompensation whatsoever.       Assessment/Plan:     1.)  MDD, recurrent, mild  2.)  JÚNIOR  3.)      After discussion of risks, benefits, potential side effects, alternatives, patient would like to continue on current regimen as is without any changes due to its effectiveness.  We will continue Wellbutrin 200 mg twice daily and patient will be referred to clinical navigation for connection with resources  including financial planning, opportunities for social interaction such as club house.  He denies acute mental complaints or concerns at this time      Aware of 24 hour and weekend coverage for urgent situations accessed by calling [facility] main practice number    Medications Risks/Benefits      Risks, Benefits And Possible Side Effects Of Medications:    Risks, benefits, and possible side effects of medications explained to Venkatesh and he verbalizes understanding and agreement for treatment.    Controlled Medication Discussion:     Not applicable - controlled prescriptions are not prescribed by this practice    Psychotherapy Provided:     Individual psychotherapy provided: Crisis/safety plan discussed with Venkatesh. Provided at least 16 minutes of distinct psychotherapy using a combination of supportive, interpersonal, motivational, and problem solving approaches to address psychological distress and enhance coping strategies.     Treatment Plan:    Completed and signed during the session:  We will complete at next appointment due to lack of time today      Visit Time    Visit Start Time: 9:00 AM  Visit Stop Time: 9:25 AM  Total Visit Duration:  25 minutes     The total visit duration detailed above includes: patient engagement, medication management, psychotherapy/counseling, discussion regarding treatment goals, documentation, review of past medical records, and coordination of care.      Note Share Disclaimer:     This note was not shared with the patient due to reasonable likelihood of causing patient harm      Arie Greenwood DO  Psychiatry  02/26/25

## 2025-05-12 ENCOUNTER — TELEPHONE (OUTPATIENT)
Age: 75
End: 2025-05-12

## 2025-05-12 NOTE — TELEPHONE ENCOUNTER
Patient is calling regarding cancelling an appointment.    Date/Time: 5/13/25 at 9am    Reason: patient needed a later time    Patient was rescheduled: YES [x] NO []  If yes, when was Patient reschedule for: 5/13/25 at 11:30am    Patient requesting call back to reschedule: YES [] NO [x]

## 2025-05-13 ENCOUNTER — TELEPHONE (OUTPATIENT)
Age: 75
End: 2025-05-13

## 2025-05-13 NOTE — TELEPHONE ENCOUNTER
Patient is calling regarding cancelling an appointment.    Date/Time: 5/13 @11:30    Reason: pt sick    Patient was rescheduled: YES [] NO [x]  If yes, when was Patient reschedule for:     Patient requesting call back to reschedule: YES [] NO [x]    Pt will call back when feeling better to r/s appt and to get a refill for medication.

## 2025-05-18 DIAGNOSIS — F32.A DEPRESSION, UNSPECIFIED DEPRESSION TYPE: ICD-10-CM

## 2025-05-20 ENCOUNTER — TELEPHONE (OUTPATIENT)
Age: 75
End: 2025-05-20

## 2025-05-22 RX ORDER — BUPROPION HYDROCHLORIDE 200 MG/1
TABLET, EXTENDED RELEASE ORAL
Qty: 180 TABLET | Refills: 0 | OUTPATIENT
Start: 2025-05-22

## 2025-05-27 DIAGNOSIS — F32.A DEPRESSION, UNSPECIFIED DEPRESSION TYPE: ICD-10-CM

## 2025-05-28 ENCOUNTER — OFFICE VISIT (OUTPATIENT)
Dept: PSYCHIATRY | Facility: CLINIC | Age: 75
End: 2025-05-28
Payer: COMMERCIAL

## 2025-05-28 DIAGNOSIS — F41.1 GENERALIZED ANXIETY DISORDER: ICD-10-CM

## 2025-05-28 DIAGNOSIS — F32.A DEPRESSION, UNSPECIFIED DEPRESSION TYPE: Primary | ICD-10-CM

## 2025-05-28 DIAGNOSIS — F41.9 ANXIETY: ICD-10-CM

## 2025-05-28 PROCEDURE — 90833 PSYTX W PT W E/M 30 MIN: CPT | Performed by: STUDENT IN AN ORGANIZED HEALTH CARE EDUCATION/TRAINING PROGRAM

## 2025-05-28 PROCEDURE — 99214 OFFICE O/P EST MOD 30 MIN: CPT | Performed by: STUDENT IN AN ORGANIZED HEALTH CARE EDUCATION/TRAINING PROGRAM

## 2025-05-28 RX ORDER — BUPROPION HYDROCHLORIDE 200 MG/1
TABLET, EXTENDED RELEASE ORAL
Qty: 180 TABLET | Refills: 0 | Status: SHIPPED | OUTPATIENT
Start: 2025-05-28 | End: 2025-05-30 | Stop reason: SDUPTHER

## 2025-05-28 NOTE — PSYCH
"Client Name: Venkatesh Pringle       Client YOB: 1950  : 1950    Treatment Team:     Psychiatrist: Arie Greenwood DO      Healthcare Provider  Noel Hardwick MD  [address] [place] [address]. [address]  [place] [address]        Plan Type: adolescent/adult (14 and over) Initial    My Personal Strengths are (in the client's own words):  \"intellect\"    The stressors and triggers that may put me at risk are:  Politics, finances    Coping skills I can use to keep myself calm and safe:  Art, creative activities    Coping skills/supports I can use to maintain abstinence from substance use:   Not Applicable    The people that provide me with help and support: (Include name, contact, and how they can help)    Support Persons #1:   *Extended Emergency Contact Information  Primary Emergency Contact: rohini fletcher  Mobile Phone: [phone]  Relation: Friend   *How they can help me? \"Emotional support\"          If it is an emergency and you need immediate help, call     If there is a possibility of danger to yourself or others, call the following crisis hotline resources:     Adult Crisis Numbers  Suicide Prevention Hotline - Dial   [facility]: [phone]  [facility]: [phone]  [facility]: [phone]  [facility]: [phone]  [facility]: [phone]  [facility]: [phone]  [facility]: [phone]  [facility]: [phone] (daytime).       [phone] (after hours, weekends, holidays)     Child/Adolescent Crisis Numbers   [facility]: [phone]   [facility]: [phone]   [facility]: [phone]   [facility]: [phone]    Please note: Some [facility] do not have a separate number for Child/Adolescent specific crisis. If your county is not listed under Child/Adolescent, please call the adult number for your county     National Talk to Text Line   All Ages - [phone]    In the event your " feelings become unmanageable, and you cannot reach your support system, you will call 911 immediately or go to the nearest hospital emergency room. If you have any physical or medical emergency or feel as if you are in physical/medical distress, call 911 immediately or go to the nearest hospital emergency room.

## 2025-05-30 RX ORDER — BUPROPION HYDROCHLORIDE 200 MG/1
TABLET, EXTENDED RELEASE ORAL
Qty: 180 TABLET | Refills: 0 | Status: SHIPPED | OUTPATIENT
Start: 2025-05-30

## 2025-05-30 NOTE — PSYCH
MEDICATION MANAGEMENT NOTE        [facility]      Name and Date of Birth:  Venkatesh Pringle 75 y.o. 1950 MRN: [id number]    Date of Visit: May 30, 2025    Reason for Visit: Follow-up visit for medication management       SUBJECTIVE:    Venkatesh Pringle is a 75 y.o. male with past psychiatric history significant for MDD, JÚNIOR who was personally seen and evaluated today at the [facility] outpatient clinic for follow-up and medication management. Venkatesh denies SI, HI, AVH, delusions, dee since our last appointment. After discussion of risks, benefits, potential side effects, alternatives, he would like to stay on current regimen as is without any changes due to its effectiveness.  He denies acute mental complaints or concerns at this time.          Current Rating Scores:     None completed today.    Review Of Systems:      Constitutional negative   ENT negative   Cardiovascular negative   Respiratory negative   Gastrointestinal negative   Genitourinary negative   Musculoskeletal negative   Integumentary negative   Neurological negative   Endocrine negative   Other Symptoms none, all other systems are negative       Past Psychiatric History: (unchanged information from previous note copied and italicized) - Information that is bolded has been updated.     See intake    Substance Abuse History: (unchanged information from previous note copied and italicized) - Information that is bolded has been updated.     See intake    Social History: (unchanged information from previous note copied and italicized) - Information that is bolded has been updated.     See intake    Traumatic History: (unchanged information from previous note copied and italicized) - Information that is bolded has been updated.     See intake      Past Medical History:    Past Medical History:   Diagnosis Date    Anxiety     Arthritis     Depression     GERD (gastroesophageal  "reflux disease)     Hyperlipidemia         Past Surgical History:   Procedure Laterality Date    COLONOSCOPY      PAROTIDECTOMY Left      Allergies   Allergen Reactions    Prednisone Other (See Comments)     Pt states \" I felt like the veins in my neck were over pressurized\"       Substance Abuse History:    Social History     Substance and Sexual Activity   Alcohol Use Yes    Alcohol/week: 2.0 standard drinks of alcohol    Types: 2 Standard drinks or equivalent per week     Social History     Substance and Sexual Activity   Drug Use Yes    Types: Marijuana    Comment: he smoke half times per wk about 1/3 joint       Social History:    Social History     Socioeconomic History    Marital status: Single     Spouse name: Not on file    Number of children: Not on file    Years of education: Not on file    Highest education level: Not on file   Occupational History    Not on file   Tobacco Use    Smoking status: Former    Smokeless tobacco: Never    Tobacco comments:     Venkatesh last smoked 6-8 yrs ago   Vaping Use    Vaping status: Never Used   Substance and Sexual Activity    Alcohol use: Yes     Alcohol/week: 2.0 standard drinks of alcohol     Types: 2 Standard drinks or equivalent per week    Drug use: Yes     Types: Marijuana     Comment: he smoke half times per wk about 1/3 joint    Sexual activity: Not on file   Other Topics Concern    Not on file   Social History Narrative    Not on file     Social Drivers of Health     Financial Resource Strain: Not on file   Food Insecurity: Not on file   Transportation Needs: Not on file   Physical Activity: Not on file   Stress: Not on file   Social Connections: Not on file   Intimate Partner Violence: Not on file   Housing Stability: Not on file       Family Psychiatric History:     No family history on file.    History Review: The following portions of the patient's history were reviewed and updated as appropriate: allergies, current medications, past family history, past " medical history, past social history, past surgical history, and problem list.         OBJECTIVE:     Vital signs in last 24 hours:    There were no vitals filed for this visit.    Mental Status Evaluation:    Appearance age appropriate, casually dressed   Behavior cooperative, mildly anxious   Speech normal rate, normal volume, normal pitch   Mood normal   Affect constricted   Thought Processes organized, goal directed   Associations intact associations   Thought Content no overt delusions   Perceptual Disturbances: no auditory hallucinations, no visual hallucinations   Abnormal Thoughts  Risk Potential Suicidal ideation - None at present  Homicidal ideation - None at present  Potential for aggression - Not at present   Orientation oriented to person, place, time/date, and situation   Memory recent and remote memory grossly intact   Consciousness alert and awake   Attention Span Concentration Span attention span and concentration are age appropriate   Intellect appears to be of average intelligence   Insight intact   Judgement intact   Muscle Strength and  Gait normal muscle strength and normal muscle tone, normal gait and normal balance   Motor activity no abnormal movements   Language no difficulty naming common objects, no difficulty repeating a phrase, no difficulty writing a sentence   Fund of Knowledge adequate knowledge of current events  adequate fund of knowledge regarding past history  adequate fund of knowledge regarding vocabulary    Pain none   Pain Scale Did not ask patient to formally rate       Laboratory Results: I have personally reviewed all pertinent laboratory/tests results    Recent Labs (last 2 months):   No visits with results within 2 Month(s) from this visit.   Latest known visit with results is:   Hospital Outpatient Visit on 10/17/2022   Component Date Value    Case Report 10/17/2022                      Value:Surgical Pathology Report                         Case: [id number]                                    Authorizing Provider:  Venkatesh Pichardo MD      Collected:           10/17/2022 0934              Ordering Location:     [facility] Received:            10/18/2022 0847              Pathologist:           Hubert Moise MD                                                                Specimens:   A) - Polyp, Colorectal, Sigmoid colon polyps  x2                                                    B) - Polyp, Colorectal, Rectal polyps  x2                                                  Final Diagnosis 10/17/2022                      Value:A. Sigmoid colon polyp x2 (biopsy):                              - Portions of polypoid colonic mucosa with focal serrated features,                           compatible with hyperplastic polyps.                                                     B. Rectal polyp x2 (biopsy):                             - Portions of serrated colon polyps compatible with hyperplastic                           polyps.    Additional Information 10/17/2022                      Value:All reported additional testing was performed with appropriately reactive                           controls.  These tests were developed and their performance                           characteristics determined by [facility] or                           appropriate performing facility, though some tests may be performed on                           tissues which have not been validated for performance characteristics                           (such as staining performed on alcohol exposed cell blocks and decalcified                           tissues).  Results should be interpreted with caution and in the context                           of the patients’ clinical condition. These tests may not be cleared or                           approved by the U.S. Food and Drug Administration, though the FDA has                           determined that such clearance or  "approval is not necessary. These tests                           are used for clinical purposes and they should not be regarded as                           investigational or for research. This laboratory has been approved by CLIA                           , designated as a high-complexity laboratory and is qualified to perform                           these tests. Interpretation performed at [facility], [address] [address]                          .    Synoptic Checklist 10/17/2022                      Value:                            COLON/RECTUM POLYP FORM - GI - All Specimens                                                                                     :    Other      Gross Description 10/17/2022                      Value:                           A. The specimen is received in formalin, labeled with the patient's name                           and hospital number, and is designated \"sigmoid colon polyp x2\".  The                           specimen consists of 2 tan flat and elongated tissue fragments measuring                           0.4-0.5 cm in greatest dimension.  The specimen is entirely submitted in a                           screened cassette.                                                                                  B. The specimen is received in formalin, labeled with the patient's name                           and hospital number, and is designated \"rectal polyp x2\".  The specimen                           consists of 4 tan-pink polypoid tissue fragments measuring 0.3-0.4 cm in                           greatest dimension.  The specimen is entirely submitted in a screened                           cassette.                                                    Note: The estimated total formalin fixation time based upon information                           provided by the submitting clinician and the standard processing schedule        "                    is under 72 hours.  RIAchristine Solis                                                                Suicide/Homicide Risk Assessment:    Risk of Harm to Self:  The following ratings are based on assessment at the time of the interview  Historical Risk Factors include: chronic psychiatric problems  Protective Factors: no current suicidal ideation, access to mental health treatment, compliant with medications, compliant with mental health treatment, having a desire to be alive, sense of importance of health and wellness    Risk of Harm to Others:  The following ratings are based on assessment at the time of the interview  Historical Risk Factors include: none.  Protective Factors: no current homicidal ideation, access to mental health treatment    The following interventions are recommended: contracts for safety at present - agrees to go to ED if feeling unsafe, contracts for safety at present - agrees to call Crisis Intervention Service if feeling unsafe      Lethality Statement:    Based on today's assessment and clinical criteria, Venkatesh Pringle contracts for safety and is not an imminent risk of harm to self or others. Outpatient level of care is deemed appropriate at this current time. Venkatesh understands that if they can no longer contract for safety, they need to call the office or report to their nearest Emergency Room for immediate evaluation. They voiced understanding and agreement to call 911 or head to the nearest ED should they have any physical or mental decompensation whatsoever.       Assessment/Plan:     1.)  MDD, recurrent, mild  2.)  JÚNIOR  3.)      After discussion of risks, benefits, potential side effects, alternatives, patient would like to continue on current regimen as is without any changes due to its effectiveness.  We will continue Wellbutrin 200 mg twice daily.  He denies acute mental health complaints or concerns at this time      Aware of 24 hour and weekend coverage for  urgent situations accessed by calling [facility] main practice number    Medications Risks/Benefits      Risks, Benefits And Possible Side Effects Of Medications:    Risks, benefits, and possible side effects of medications explained to Venkatesh and he verbalizes understanding and agreement for treatment.    Controlled Medication Discussion:     Not applicable - controlled prescriptions are not prescribed by this practice    Psychotherapy Provided:     Individual psychotherapy provided: Crisis/safety plan discussed with Venkatesh. Provided at least 16 minutes of distinct psychotherapy using a combination of supportive, interpersonal, motivational, and problem solving approaches to address psychological distress and enhance coping strategies.     Treatment Plan:    Completed and signed during the session: We will complete at next appointment due to lack of time today      Visit Time    Visit Start Time: 2:00 PM  Visit Stop Time: 2:28 PM  Total Visit Duration: 28 minutes     The total visit duration detailed above includes: patient engagement, medication management, psychotherapy/counseling, discussion regarding treatment goals, documentation, review of past medical records, and coordination of care.      Note Share Disclaimer:     This note was not shared with the patient due to reasonable likelihood of causing patient harm      rAie Greenwood DO  Psychiatry  05/30/25

## 2025-08-13 ENCOUNTER — APPOINTMENT (OUTPATIENT)
Age: 75
End: 2025-08-13
Attending: ORTHOPAEDIC SURGERY
Payer: COMMERCIAL

## 2025-08-13 ENCOUNTER — OFFICE VISIT (OUTPATIENT)
Age: 75
End: 2025-08-13
Payer: COMMERCIAL

## 2025-08-13 PROCEDURE — 73560 X-RAY EXAM OF KNEE 1 OR 2: CPT
